# Patient Record
Sex: FEMALE | Race: WHITE | Employment: OTHER | URBAN - METROPOLITAN AREA
[De-identification: names, ages, dates, MRNs, and addresses within clinical notes are randomized per-mention and may not be internally consistent; named-entity substitution may affect disease eponyms.]

---

## 2017-05-15 PROBLEM — I65.23 BILATERAL CAROTID ARTERY STENOSIS: Status: ACTIVE | Noted: 2017-05-15

## 2017-11-20 PROBLEM — I21.4 NSTEMI (NON-ST ELEVATED MYOCARDIAL INFARCTION) (HCC): Status: ACTIVE | Noted: 2017-11-20

## 2017-11-20 PROBLEM — I21.4 NSTEMI (NON-ST ELEVATED MYOCARDIAL INFARCTION) (HCC): Status: RESOLVED | Noted: 2017-11-20 | Resolved: 2017-11-20

## 2017-11-20 PROBLEM — I10 ESSENTIAL HYPERTENSION: Chronic | Status: ACTIVE | Noted: 2017-11-20

## 2017-11-20 PROBLEM — I65.23 BILATERAL CAROTID ARTERY STENOSIS: Status: RESOLVED | Noted: 2017-05-15 | Resolved: 2017-11-20

## 2017-11-20 PROBLEM — E78.5 HYPERLIPIDEMIA LDL GOAL <100: Chronic | Status: ACTIVE | Noted: 2017-11-20

## 2017-11-21 LAB
LEFT VENTRICULAR EJECTION FRACTION MODE: NORMAL
LV EF: 75 %

## 2017-11-30 PROBLEM — R00.1 SINUS BRADYCARDIA: Status: ACTIVE | Noted: 2017-11-30

## 2017-11-30 PROBLEM — Z90.49 HISTORY OF APPENDECTOMY: Status: ACTIVE | Noted: 2017-11-30

## 2017-11-30 PROBLEM — Z87.891 HISTORY OF TOBACCO ABUSE: Status: ACTIVE | Noted: 2017-11-30

## 2017-11-30 PROBLEM — M25.512 CHRONIC LEFT SHOULDER PAIN: Status: ACTIVE | Noted: 2017-11-30

## 2017-11-30 PROBLEM — M47.816 OSTEOARTHRITIS OF LUMBAR SPINE: Status: ACTIVE | Noted: 2017-11-30

## 2017-11-30 PROBLEM — I25.10 CORONARY ARTERY DISEASE INVOLVING NATIVE CORONARY ARTERY OF NATIVE HEART WITHOUT ANGINA PECTORIS: Status: ACTIVE | Noted: 2017-11-30

## 2017-11-30 PROBLEM — Z90.49 H/O PAROTIDECTOMY: Status: ACTIVE | Noted: 2017-11-30

## 2017-11-30 PROBLEM — G89.29 CHRONIC LEFT SHOULDER PAIN: Status: ACTIVE | Noted: 2017-11-30

## 2018-05-01 ENCOUNTER — TELEPHONE (OUTPATIENT)
Dept: CARDIOLOGY CLINIC | Age: 78
End: 2018-05-01

## 2018-05-21 ENCOUNTER — OFFICE VISIT (OUTPATIENT)
Dept: VASCULAR SURGERY | Age: 78
End: 2018-05-21
Payer: MEDICARE

## 2018-05-21 ENCOUNTER — HOSPITAL ENCOUNTER (OUTPATIENT)
Dept: CARDIOLOGY | Age: 78
Discharge: HOME | End: 2018-05-21
Admitting: SURGERY
Payer: MEDICARE

## 2018-05-21 DIAGNOSIS — I65.23 BILATERAL CAROTID ARTERY STENOSIS: Primary | ICD-10-CM

## 2018-05-21 DIAGNOSIS — I65.23 BILATERAL CAROTID ARTERY STENOSIS: ICD-10-CM

## 2018-05-21 PROCEDURE — 93880 EXTRACRANIAL BILAT STUDY: CPT

## 2018-05-21 PROCEDURE — 99213 OFFICE O/P EST LOW 20 MIN: CPT | Performed by: NURSE PRACTITIONER

## 2018-07-24 RX ORDER — METOPROLOL SUCCINATE 25 MG/1
25 TABLET, EXTENDED RELEASE ORAL DAILY
Qty: 90 TABLET | Refills: 3 | Status: SHIPPED | OUTPATIENT
Start: 2018-07-24 | End: 2019-03-14 | Stop reason: SDUPTHER

## 2018-09-17 ENCOUNTER — OFFICE VISIT (OUTPATIENT)
Dept: CARDIOLOGY CLINIC | Age: 78
End: 2018-09-17
Payer: MEDICARE

## 2018-09-17 VITALS
HEART RATE: 57 BPM | BODY MASS INDEX: 30.62 KG/M2 | WEIGHT: 166.4 LBS | HEIGHT: 62 IN | DIASTOLIC BLOOD PRESSURE: 60 MMHG | RESPIRATION RATE: 16 BRPM | SYSTOLIC BLOOD PRESSURE: 138 MMHG

## 2018-09-17 DIAGNOSIS — Z87.891 HISTORY OF TOBACCO ABUSE: ICD-10-CM

## 2018-09-17 DIAGNOSIS — I25.2 H/O NON-ST ELEVATION MYOCARDIAL INFARCTION (NSTEMI): ICD-10-CM

## 2018-09-17 DIAGNOSIS — G89.29 CHRONIC LEFT SHOULDER PAIN: ICD-10-CM

## 2018-09-17 DIAGNOSIS — M47.816 OSTEOARTHRITIS OF LUMBAR SPINE, UNSPECIFIED SPINAL OSTEOARTHRITIS COMPLICATION STATUS: ICD-10-CM

## 2018-09-17 DIAGNOSIS — I10 ESSENTIAL HYPERTENSION: Chronic | ICD-10-CM

## 2018-09-17 DIAGNOSIS — I25.10 CORONARY ARTERY DISEASE INVOLVING NATIVE CORONARY ARTERY OF NATIVE HEART WITHOUT ANGINA PECTORIS: Primary | ICD-10-CM

## 2018-09-17 DIAGNOSIS — E78.5 HYPERLIPIDEMIA LDL GOAL <100: Chronic | ICD-10-CM

## 2018-09-17 DIAGNOSIS — M25.512 CHRONIC LEFT SHOULDER PAIN: ICD-10-CM

## 2018-09-17 DIAGNOSIS — R00.1 SINUS BRADYCARDIA: ICD-10-CM

## 2018-09-17 DIAGNOSIS — G89.29 CHRONIC MIDLINE LOW BACK PAIN, WITH SCIATICA PRESENCE UNSPECIFIED: ICD-10-CM

## 2018-09-17 DIAGNOSIS — K21.9 GASTROESOPHAGEAL REFLUX DISEASE, ESOPHAGITIS PRESENCE NOT SPECIFIED: ICD-10-CM

## 2018-09-17 DIAGNOSIS — M54.5 CHRONIC MIDLINE LOW BACK PAIN, WITH SCIATICA PRESENCE UNSPECIFIED: ICD-10-CM

## 2018-09-17 PROBLEM — M54.50 CHRONIC MIDLINE LOW BACK PAIN: Status: ACTIVE | Noted: 2018-09-17

## 2018-09-17 PROCEDURE — 99213 OFFICE O/P EST LOW 20 MIN: CPT | Performed by: INTERNAL MEDICINE

## 2018-09-17 PROCEDURE — 93000 ELECTROCARDIOGRAM COMPLETE: CPT | Performed by: INTERNAL MEDICINE

## 2018-09-17 RX ORDER — NAPROXEN SODIUM 220 MG
220 TABLET ORAL EVERY OTHER DAY
COMMUNITY
End: 2019-06-28

## 2018-09-17 NOTE — PROGRESS NOTES
OFFICE VISIT        PRIMARY CARE PHYSICIAN:    Doris Bull, III, DO       ALLERGIES / SENSITIVITIES:    Allergies   Allergen Reactions    Other Other (See Comments)     Novocaine--throat swelled up    Sulfa Antibiotics Anaphylaxis        REVIEWED MEDICATIONS:      Current Outpatient Prescriptions:     naproxen sodium (ALEVE) 220 MG tablet, Take 220 mg by mouth every other day, Disp: , Rfl:     metoprolol succinate (TOPROL XL) 25 MG extended release tablet, Take 1 tablet by mouth daily, Disp: 90 tablet, Rfl: 3    amLODIPine (NORVASC) 10 MG tablet, Take 1 tablet by mouth daily, Disp: 90 tablet, Rfl: 3    isosorbide mononitrate (IMDUR) 30 MG extended release tablet, Take 1 tablet by mouth daily, Disp: 90 tablet, Rfl: 3    clopidogrel (PLAVIX) 75 MG tablet, Take 1 tablet by mouth daily, Disp: 90 tablet, Rfl: 3    atorvastatin (LIPITOR) 40 MG tablet, Take 1 tablet by mouth every morning, Disp: 30 tablet, Rfl: 11    valsartan-hydrochlorothiazide (DIOVAN-HCT) 160-12.5 MG per tablet, Take 1 tablet by mouth every morning, Disp: 30 tablet, Rfl: 11    nitroGLYCERIN (NITROSTAT) 0.4 MG SL tablet, Place 1 tablet under the tongue every 5 minutes as needed for Chest pain, Disp: 25 tablet, Rfl: 3    Menthol, Topical Analgesic, (BIOFREEZE ROLL-ON EX), Apply topically as needed , Disp: , Rfl:     aspirin EC 81 MG EC tablet, Take 81 mg by mouth every morning , Disp: , Rfl:       S: REASON FOR VISIT:    Coronary artery disease. Mala Wilson is a pleasant, 70-year-old lady with severe, 3 vessel coronary artery disease. She declined bypass surgery. She has been stable from a cardiac standpoint. She denies chest pain or dyspnea with her daily activities. She denies orthopnea, PND's, lower extremity swelling, palpitations, dizziness, presyncope or syncope. She has been having acid reflux symptoms with a burning/sour sensation in her throat after eating certain foods with belching.   She takes the low dose aspirin every other day: not on days when she takes Aleve for her back pain. REVIEW OF SYSTEMS:    CONSTITUTIONAL:  Denies fevers, chills, night sweats or fatigue. HEENT:  Denies any unusual headaches. Denies recent changes in hearing or vision. Denies dysphagia, hoarseness, hemoptysis, hematemesis or epistaxis. ENDOCRINE:  Denies polyphagia, polydipsia or polyuria. Denies heat or cold intolerance. MUSCULOSKELETAL:  She has chronic low back pain. SKIN:  Denies rashes, ulcers or itching. HEME/LYMPH:  Denies any palpable lymph nodes. She denies bleeding, but reports easy bruisability. HEART:  As above. LUNGS:  Denies cough or sputum production. GI: Denies abdominal pain, nausea, vomiting, diarrhea, constipation, rectal bleeding or tarry stools. She does have acid reflux symptoms. :  Denies hematuria or dysuria. PSYCHIATRIC:  Denies mood changes, anxiety or depression. NEUROLOGIC:  Denies memory loss, motor weakness, numbness, tingling or tremors.                   CARDIOVASCULAR HISTORY:    1. Tobacco abuse:  Patient smoked / PPD x50 years:  Quit 2017. 2.  Hypertension. 3.  Hyperlipidemia. 4.  Severe three-vessel coronary artery disease:  a.  NSTEMI 2017.  b.  Cardiac catheterization 2017:  Severe calcific three-vessel CAD. Systemic HTN. Normal left ventricular size and systolic function. Elevated LVEDP. 5.  Bilateral carotid stenosis:  a.  Carotid ultrasound done on 05/15/2017 reportedly showered 60-79% stenosis on the right and 0-40% stenosis on the left.     PAST MEDICAL HISTORY:  1. As under cardiovascular history. 2.  Osteoarthritis of the lower back. 3.  Chronic left shoulder pain. 4.  Status post appendectomy. 5.  Status post parotidectomy.     FAMILY HISTORY:  Mother  at age 64 from a \"massive heart attack. \"  Father  of prostate cancer. One sister  of liver cirrhosis secondary to alcohol abuse.   One brother  in his 46s from heart disease.     SOCIAL HISTORY:  Smoked 1/4 PPD x50 years, quit 11/20/2017. She denies alcohol or illicit drug use. O:  COMPLETE PHYSICAL EXAM:      /60   Pulse 57   Resp 16   Ht 5' 2\" (1.575 m)   Wt 166 lb 6.4 oz (75.5 kg)   LMP  (Approximate)   BMI 30.43 kg/m²      General:          Elderly lady in no acute distress. HEENT:           Normocephalic and atraumatic head. Bilateral carotid bruits heard. Carotid upstrokes normal bilaterally. No thyromegaly. Sclerae not icteric. No xanthelasmas. Mucous membranes moist.  Chest:              Symmetrical and nontender. No deformities. Lungs:             Clear to auscultation bilaterally. Heart:              Normal S1 and S2. No S3 or S4. No murmurs or rubs. Abdomen:        Soft, nontender without organomegaly or masses. No bruits. Normal bowel sounds. Extremities:     No edema. Pulses normal.  Skin:                Normal turgor. No rashes or ulcers noted. Neurologic:      Oriented x3. No motor or sensory deficits detected. REVIEW OF DIAGNOSTIC TESTS:    1. EKG done today showed sinus bradycardia with occasional PAC's with deep T wave inversions in the inferior and anterolateral leads and with old septal MI: Unchanged. ASSESSMENT / DIAGNOSIS:   1. Severe calcific three-vessel coronary artery disease: Clinically stable. 2.  Hypertension:  Adequately controlled. 3.  Hyperlipidemia:  On statin therapy. 4.  Sinus bradycardia on low dose beta blocker therapy. 5.  Osteoarthritis of the lower back with chronic low back pain. 6.  Chronic left shoulder pain. 7.  History of tobacco abuse. 8.  Acid reflux symptoms. TREATMENT PLAN:  1. Reassure. 2.  Continue current medications. 3.  Patient advised to take symptomatic treatment for acid reflux symptoms: Consider H2 blockers or proton pump inhibitors: Will defer to primary care physician. 4.  Follow up with Cardiology in 6 months or on a prn basis. Rachel [address] [facility] [address]  [phone]

## 2018-09-17 NOTE — PATIENT INSTRUCTIONS
especially good for you. Examples include spinach, carrots, peaches, and berries.     · Foods high in fiber can reduce your cholesterol and provide important vitamins and minerals. High-fiber foods include whole-grain cereals and breads, oatmeal, beans, brown rice, citrus fruits, and apples.     · Limit drinks and foods with added sugar. These include candy, desserts, and soda pop.    Lifestyle changes    · If your doctor recommends it, get more exercise. Walking is a good choice. Bit by bit, increase the amount you walk every day. Try for at least 30 minutes on most days of the week. You also may want to swim, bike, or do other activities.     · Do not smoke. If you need help quitting, talk to your doctor about stop-smoking programs and medicines. These can increase your chances of quitting for good. Quitting smoking may be the most important step you can take to protect your heart. It is never too late to quit. You will get health benefits right away.     · Limit alcohol to 2 drinks a day for men and 1 drink a day for women. Too much alcohol can cause health problems. Medicines    · Take your medicines exactly as prescribed. Call your doctor if you think you are having a problem with your medicine.     · If your doctor recommends aspirin, take the amount directed each day. Make sure you take aspirin and not another kind of pain reliever, such as acetaminophen (Tylenol). If you take ibuprofen (such as Advil or Motrin) for other problems, take aspirin at least 2 hours before taking ibuprofen. When should you call for help? Call 911 if you have symptoms of a heart attack.  These may include:    · Chest pain or pressure, or a strange feeling in the chest.     · Sweating.     · Shortness of breath.     · Pain, pressure, or a strange feeling in the back, neck, jaw, or upper belly or in one or both shoulders or arms.     · Lightheadedness or sudden weakness.     · A fast or irregular heartbeat.    After you call 911,

## 2019-01-03 ENCOUNTER — TELEPHONE (OUTPATIENT)
Dept: VASCULAR SURGERY | Age: 79
End: 2019-01-03

## 2019-02-08 RX ORDER — ATORVASTATIN CALCIUM 40 MG/1
40 TABLET, FILM COATED ORAL EVERY MORNING
Qty: 30 TABLET | Refills: 11 | Status: SHIPPED | OUTPATIENT
Start: 2019-02-08 | End: 2019-03-14 | Stop reason: SDUPTHER

## 2019-02-08 RX ORDER — ISOSORBIDE MONONITRATE 30 MG/1
30 TABLET, EXTENDED RELEASE ORAL DAILY
Qty: 90 TABLET | Refills: 3 | Status: SHIPPED | OUTPATIENT
Start: 2019-02-08 | End: 2019-03-14 | Stop reason: SDUPTHER

## 2019-02-08 RX ORDER — CLOPIDOGREL BISULFATE 75 MG/1
75 TABLET ORAL DAILY
Qty: 90 TABLET | Refills: 3 | Status: SHIPPED | OUTPATIENT
Start: 2019-02-08 | End: 2019-03-15 | Stop reason: SDUPTHER

## 2019-02-18 RX ORDER — AMLODIPINE BESYLATE 10 MG/1
10 TABLET ORAL DAILY
Qty: 90 TABLET | Refills: 3 | Status: SHIPPED | OUTPATIENT
Start: 2019-02-18 | End: 2019-03-15 | Stop reason: SDUPTHER

## 2019-03-13 ENCOUNTER — HOSPITAL ENCOUNTER (OUTPATIENT)
Age: 79
Discharge: HOME | End: 2019-03-15
Payer: MEDICARE

## 2019-03-13 PROCEDURE — 88305 TISSUE EXAM BY PATHOLOGIST: CPT

## 2019-03-15 RX ORDER — CLOPIDOGREL BISULFATE 75 MG/1
75 TABLET ORAL DAILY
Qty: 90 TABLET | Refills: 3 | Status: SHIPPED | OUTPATIENT
Start: 2019-03-15 | End: 2019-06-28

## 2019-03-15 RX ORDER — METOPROLOL SUCCINATE 25 MG/1
25 TABLET, EXTENDED RELEASE ORAL DAILY
Qty: 90 TABLET | Refills: 3 | Status: SHIPPED | OUTPATIENT
Start: 2019-03-15 | End: 2019-06-28 | Stop reason: DRUGHIGH

## 2019-03-15 RX ORDER — ISOSORBIDE MONONITRATE 30 MG/1
30 TABLET, EXTENDED RELEASE ORAL DAILY
Qty: 90 TABLET | Refills: 3 | Status: SHIPPED | OUTPATIENT
Start: 2019-03-15 | End: 2019-06-28 | Stop reason: DRUGHIGH

## 2019-03-15 RX ORDER — ATORVASTATIN CALCIUM 40 MG/1
40 TABLET, FILM COATED ORAL EVERY MORNING
Qty: 90 TABLET | Refills: 3 | Status: SHIPPED
Start: 2019-03-15 | End: 2020-04-13 | Stop reason: SDUPTHER

## 2019-03-15 RX ORDER — AMLODIPINE BESYLATE 10 MG/1
10 TABLET ORAL DAILY
Qty: 90 TABLET | Refills: 3 | Status: SHIPPED | OUTPATIENT
Start: 2019-03-15 | End: 2019-10-18

## 2019-03-15 RX ORDER — VALSARTAN AND HYDROCHLOROTHIAZIDE 160; 12.5 MG/1; MG/1
1 TABLET, FILM COATED ORAL EVERY MORNING
Qty: 90 TABLET | Refills: 3 | Status: SHIPPED
Start: 2019-03-15 | End: 2020-06-02 | Stop reason: SDUPTHER

## 2019-04-09 ENCOUNTER — APPOINTMENT (OUTPATIENT)
Dept: GENERAL RADIOLOGY | Age: 79
End: 2019-04-09
Payer: MEDICARE

## 2019-04-09 ENCOUNTER — HOSPITAL ENCOUNTER (EMERGENCY)
Age: 79
Discharge: HOME | End: 2019-04-09
Attending: EMERGENCY MEDICINE
Payer: MEDICARE

## 2019-04-09 VITALS
OXYGEN SATURATION: 94 % | HEART RATE: 73 BPM | TEMPERATURE: 97.9 F | SYSTOLIC BLOOD PRESSURE: 163 MMHG | RESPIRATION RATE: 18 BRPM | DIASTOLIC BLOOD PRESSURE: 62 MMHG
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
## 2019-04-09 DIAGNOSIS — S80.01XA CONTUSION OF RIGHT KNEE, INITIAL ENCOUNTER: ICD-10-CM

## 2019-04-09 PROCEDURE — 6370000000 HC RX 637 (ALT 250 FOR IP): Performed by: EMERGENCY MEDICINE

## 2019-04-09 PROCEDURE — 73564 X-RAY EXAM KNEE 4 OR MORE: CPT

## 2019-04-09 PROCEDURE — 99284 EMERGENCY DEPT VISIT MOD MDM: CPT

## 2019-04-09 RX ORDER — HYDROCODONE BITARTRATE AND ACETAMINOPHEN 5; 325 MG/1; MG/1
1 TABLET ORAL ONCE
Status: DISCONTINUED | OUTPATIENT
Start: 2019-04-09 | End: 2019-04-09

## 2019-04-09 RX ORDER — ACETAMINOPHEN 500 MG
1000 TABLET ORAL ONCE
Status: COMPLETED | OUTPATIENT
Start: 2019-04-09 | End: 2019-04-09

## 2019-04-09 RX ADMIN — ACETAMINOPHEN 1000 MG: 500 TABLET ORAL at 20:10

## 2019-04-09 ASSESSMENT — PAIN SCALES - GENERAL
PAINLEVEL_OUTOF10: 0
PAINLEVEL_OUTOF10: 2

## 2019-04-09 NOTE — ED NOTES
Ice applied to right knee, trace bruising noted. Patient states she is able to bend knee but having trouble bearing weight on leg due to pain. Denies pain in hip. Denies numbness or tingling in leg. Pedal pulse 3+.       Lukas Malloy RN  04/09/19 1950

## 2019-04-09 NOTE — ED PROVIDER NOTES
Department of Emergency Medicine   ED  Provider Note  Admit Date/RoomTime: 4/9/2019  6:54 PM  ED Room: 24/24 4/9/19  7:29 PM    History of Present Illness:   Carin Kearney is a 78 y.o. female presenting to the ED for a fall on her right knee, beginning a few minutes prior to arrival.  The complaint has been persistent, moderate in severity, and worsened by attempting to ambulate. She states she has been unable to ambulate since the fall due to pain. She did not take anything for her symptoms prior to arrival. Daughter is concerned that the patient shoulid be discharged with a walker as her gait is somewhat unsteady. Review of Systems:   Pertinent positives and negatives are stated within HPI, all other systems reviewed and are negative.          --------------------------------------------- PAST HISTORY ---------------------------------------------  Past Medical History:  has a past medical history of Coronary artery disease involving native coronary artery of native heart without angina pectoris, Hyperlipidemia, Hypertension, NSTEMI (non-ST elevated myocardial infarction) ([facility] [address] Osteoarthritis, and Shoulder pain. Past Surgical History:  has a past surgical history that includes Appendectomy; Parotidectomy; and Cardiac catheterization (11/20/2017). Social History:  reports that she quit smoking about 16 months ago. Her smoking use included cigarettes. She has a 42.75 pack-year smoking history. She has never used smokeless tobacco. She reports that she does not drink alcohol or use drugs. Family History: family history includes Alcohol Abuse in her brother, father, and sister; Early Death in her mother and sister; Heart Disease in her brother and mother; Prostate Cancer in her father. The patients home medications have been reviewed. Allergies:  Other and Sulfa antibiotics    -------------------------------------------------- RESULTS -------------------------------------------------  All laboratory and radiology results have been personally reviewed by myself   LABS:  No results found for this visit on 04/09/19. RADIOLOGY:  Interpreted by Radiologist.  XR KNEE RIGHT (MIN 4 VIEWS)   Final Result   No acute osseous abnormality appreciable. Chronic and degenerative findings as noted. Continued follow-up advised should symptoms persist in any event.          ------------------------- NURSING NOTES AND VITALS REVIEWED ---------------------------   The nursing notes within the ED encounter and vital signs as below have been reviewed. BP (!) 163/62   Pulse 73   Temp 97.9 °F (36.6 °C) (Oral)   Resp 18   LMP  (Approximate)   SpO2 94%   Oxygen Saturation Interpretation: Normal      ---------------------------------------------------PHYSICAL EXAM--------------------------------------    Constitutional/General: Alert and oriented x3, well appearing, non toxic in NAD  Head: Normocephalic and atraumatic  Eyes: PERRL, EOMI, conjunctiva normal, sclera non icteric  Mouth: Oropharynx clear, handling secretions, no trismus, no asymmetry of the posterior oropharynx or uvular edema  Neck: Supple, full ROM, non tender to palpation in the midline, no stridor, no crepitus, no meningeal signs  Respiratory: Lungs clear to auscultation bilaterally, no wheezes, rales, or rhonchi. Not in respiratory distress  Cardiovascular:  Regular rate. Regular rhythm. No murmurs, gallops, or rubs. 2+ distal pulses  Chest: No chest wall tenderness  GI:  Abdomen Soft, Non tender, Non distended. +BS. No organomegaly, no palpable masses,  No rebound, guarding, or rigidity. Musculoskeletal: Moves all extremities x 4. Warm and well perfused. The right knee is modestly swollen and the distal medial femur is tender to palpation  Integument: skin warm and dry. No rashes.    Lymphatic: no lymphadenopathy noted  Neurologic: GCS 15, no focal deficits, symmetric strength 5/5 in the upper and lower extremities bilaterally  Psychiatric: Normal Affect      ------------------------------ ED COURSE/MEDICAL DECISION MAKING----------------------  Medications   acetaminophen (TYLENOL) tablet 1,000 mg (has no administration in time range)         Medical Decision Making:    Fall, knee pain and swelling. No pain of hip or foot. Xrays obtained and negative for fracture. She was able to bear weight to the bathroom even prior to ace wrap and tylenol. They have a walker at home and the daughter is available to help her into the house. Okay for discharge home. Counseling: The emergency provider has spoken with the patient and discussed todays results, in addition to providing specific details for the plan of care and counseling regarding the diagnosis and prognosis. Questions are answered at this time and they are agreeable with the plan.      --------------------------------- IMPRESSION AND DISPOSITION ---------------------------------    IMPRESSION  1. Fall, initial encounter    2.  Contusion of right knee, initial encounter        DISPOSITION  Disposition: Discharge to home  Patient condition is good               DO Phill Wellington  04/09/19 2007

## 2019-05-28 RX ORDER — NITROGLYCERIN 0.4 MG/1
0.4 TABLET SUBLINGUAL EVERY 5 MIN PRN
Qty: 25 TABLET | Refills: 3 | Status: SHIPPED
Start: 2019-05-28 | End: 2020-06-02 | Stop reason: SDUPTHER

## 2019-06-20 ENCOUNTER — TELEPHONE (OUTPATIENT)
Dept: CARDIOLOGY CLINIC | Age: 79
End: 2019-06-20

## 2019-06-20 NOTE — TELEPHONE ENCOUNTER
Daughter Fatmata Can called about her mom. Says she is taking more NTG than usual.  Wanted to see if mother's appt could be moved up. I told her he is only in [place] M-W-F and M-W is full. 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 Wants to speak with you Walt Ames.  (call first [phone] and they can get her  Cell [phone]).

## 2019-06-20 NOTE — TELEPHONE ENCOUNTER
6/20/19 @ 3:00  SPOKE [address] [place]. PAU HAS BEEN TAKING HER NITROGLYCERIN EVERY OTHER DAY FOR PAIN IN HER UPPER CHEST AND THROAT AREA. SHE STATES THE PAIN IN LIKE HEARTBURN BUT HER HEARTBURN MEDS DO NOT HELP. SHE HAS AN APPT. TO SEE DR LEE ON 6/28/19. I SPOKE TO DR LEE ABOUT THIS AND HE SAID THAT IF SHE CONTINUES TO NEED THE NITRO THIS OFTEN SHE NEEDS TO GO TO THE ER. I GAVE THIS INFORMATION TO RADHA.   FRANSISCA

## 2019-06-28 ENCOUNTER — OFFICE VISIT (OUTPATIENT)
Dept: CARDIOLOGY CLINIC | Age: 79
End: 2019-06-28
Payer: MEDICARE

## 2019-06-28 VITALS
BODY MASS INDEX: 30.07 KG/M2 | WEIGHT: 163.4 LBS | HEART RATE: 84 BPM | SYSTOLIC BLOOD PRESSURE: 122 MMHG | HEIGHT: 62 IN | RESPIRATION RATE: 18 BRPM | DIASTOLIC BLOOD PRESSURE: 66 MMHG

## 2019-06-28 DIAGNOSIS — I20.0 WORSENING ANGINA (HCC): ICD-10-CM

## 2019-06-28 DIAGNOSIS — K21.9 GASTROESOPHAGEAL REFLUX DISEASE, ESOPHAGITIS PRESENCE NOT SPECIFIED: ICD-10-CM

## 2019-06-28 DIAGNOSIS — I10 ESSENTIAL HYPERTENSION: ICD-10-CM

## 2019-06-28 DIAGNOSIS — M47.9 OSTEOARTHRITIS OF LOWER BACK: ICD-10-CM

## 2019-06-28 DIAGNOSIS — I48.91 NEW ONSET ATRIAL FIBRILLATION (HCC): ICD-10-CM

## 2019-06-28 DIAGNOSIS — M25.512 CHRONIC LEFT SHOULDER PAIN: ICD-10-CM

## 2019-06-28 DIAGNOSIS — I25.10 CAD IN NATIVE ARTERY: Primary | ICD-10-CM

## 2019-06-28 DIAGNOSIS — G89.29 CHRONIC LEFT SHOULDER PAIN: ICD-10-CM

## 2019-06-28 DIAGNOSIS — E78.5 DYSLIPIDEMIA: ICD-10-CM

## 2019-06-28 PROCEDURE — 99214 OFFICE O/P EST MOD 30 MIN: CPT | Performed by: INTERNAL MEDICINE

## 2019-06-28 PROCEDURE — 93000 ELECTROCARDIOGRAM COMPLETE: CPT | Performed by: INTERNAL MEDICINE

## 2019-06-28 RX ORDER — METOPROLOL SUCCINATE 50 MG/1
50 TABLET, EXTENDED RELEASE ORAL DAILY
Qty: 90 TABLET | Refills: 3 | Status: SHIPPED | OUTPATIENT
Start: 2019-06-28 | End: 2019-10-18 | Stop reason: DRUGHIGH

## 2019-06-28 RX ORDER — METOPROLOL SUCCINATE 50 MG/1
25 TABLET, EXTENDED RELEASE ORAL DAILY
Qty: 30 TABLET | Refills: 11 | Status: SHIPPED | OUTPATIENT
Start: 2019-06-28 | End: 2019-06-28 | Stop reason: SDUPTHER

## 2019-06-28 RX ORDER — ISOSORBIDE MONONITRATE 60 MG/1
60 TABLET, EXTENDED RELEASE ORAL DAILY
Qty: 30 TABLET | Refills: 11 | Status: SHIPPED | OUTPATIENT
Start: 2019-06-28 | End: 2019-06-28 | Stop reason: SDUPTHER

## 2019-06-28 RX ORDER — ACETAMINOPHEN 325 MG/1
325 TABLET ORAL EVERY 6 HOURS PRN
COMMUNITY

## 2019-06-28 RX ORDER — ISOSORBIDE MONONITRATE 60 MG/1
60 TABLET, EXTENDED RELEASE ORAL DAILY
Qty: 90 TABLET | Refills: 3 | Status: SHIPPED | OUTPATIENT
Start: 2019-06-28 | End: 2020-06-02 | Stop reason: SDUPTHER

## 2019-07-16 ENCOUNTER — TELEPHONE (OUTPATIENT)
Dept: CARDIOLOGY CLINIC | Age: 79
End: 2019-07-16

## 2019-07-16 RX ORDER — AMLODIPINE BESYLATE 5 MG/1
5 TABLET ORAL DAILY
Qty: 30 TABLET | Refills: 5 | Status: SHIPPED | OUTPATIENT
Start: 2019-07-16 | End: 2019-07-31 | Stop reason: SDUPTHER

## 2019-07-31 RX ORDER — AMLODIPINE BESYLATE 5 MG/1
5 TABLET ORAL DAILY
Qty: 90 TABLET | Refills: 3 | Status: SHIPPED
Start: 2019-07-31 | End: 2020-09-15 | Stop reason: SDUPTHER

## 2019-10-18 ENCOUNTER — OFFICE VISIT (OUTPATIENT)
Dept: CARDIOLOGY CLINIC | Age: 79
End: 2019-10-18
Payer: MEDICARE

## 2019-10-18 VITALS
HEART RATE: 87 BPM | RESPIRATION RATE: 18 BRPM | HEIGHT: 62 IN | WEIGHT: 165 LBS | DIASTOLIC BLOOD PRESSURE: 68 MMHG | SYSTOLIC BLOOD PRESSURE: 124 MMHG | BODY MASS INDEX: 30.36 KG/M2

## 2019-10-18 DIAGNOSIS — I10 ESSENTIAL HYPERTENSION: ICD-10-CM

## 2019-10-18 DIAGNOSIS — M54.50 CHRONIC MIDLINE LOW BACK PAIN WITHOUT SCIATICA: ICD-10-CM

## 2019-10-18 DIAGNOSIS — Z87.891 EX-SMOKER: ICD-10-CM

## 2019-10-18 DIAGNOSIS — E78.5 DYSLIPIDEMIA: ICD-10-CM

## 2019-10-18 DIAGNOSIS — I48.19 PERSISTENT ATRIAL FIBRILLATION (HCC): ICD-10-CM

## 2019-10-18 DIAGNOSIS — G89.29 CHRONIC MIDLINE LOW BACK PAIN WITHOUT SCIATICA: ICD-10-CM

## 2019-10-18 DIAGNOSIS — G89.29 CHRONIC LEFT SHOULDER PAIN: ICD-10-CM

## 2019-10-18 DIAGNOSIS — Z79.01 ANTICOAGULATED BY ANTICOAGULATION TREATMENT: ICD-10-CM

## 2019-10-18 DIAGNOSIS — M25.512 CHRONIC LEFT SHOULDER PAIN: ICD-10-CM

## 2019-10-18 DIAGNOSIS — M47.816 OSTEOARTHRITIS OF LUMBAR SPINE, UNSPECIFIED SPINAL OSTEOARTHRITIS COMPLICATION STATUS: ICD-10-CM

## 2019-10-18 DIAGNOSIS — Z86.79 H/O SINUS BRADYCARDIA: ICD-10-CM

## 2019-10-18 DIAGNOSIS — I25.118 CORONARY ARTERY DISEASE OF NATIVE ARTERY OF NATIVE HEART WITH STABLE ANGINA PECTORIS (HCC): Primary | ICD-10-CM

## 2019-10-18 PROCEDURE — 99214 OFFICE O/P EST MOD 30 MIN: CPT | Performed by: INTERNAL MEDICINE

## 2019-10-18 PROCEDURE — 93000 ELECTROCARDIOGRAM COMPLETE: CPT | Performed by: INTERNAL MEDICINE

## 2019-10-18 RX ORDER — METOPROLOL SUCCINATE 100 MG/1
100 TABLET, EXTENDED RELEASE ORAL DAILY
Qty: 90 TABLET | Refills: 5 | Status: SHIPPED | OUTPATIENT
Start: 2019-10-18 | End: 2020-12-08 | Stop reason: SDUPTHER

## 2019-10-18 RX ORDER — RANOLAZINE 500 MG/1
500 TABLET, EXTENDED RELEASE ORAL 2 TIMES DAILY
Qty: 60 TABLET | Refills: 5 | Status: SHIPPED | OUTPATIENT
Start: 2019-10-18 | End: 2019-10-18

## 2019-10-18 RX ORDER — RANOLAZINE 500 MG/1
500 TABLET, EXTENDED RELEASE ORAL 2 TIMES DAILY
Qty: 180 TABLET | Refills: 5 | Status: SHIPPED | OUTPATIENT
Start: 2019-10-18 | End: 2021-01-05 | Stop reason: SDUPTHER

## 2019-10-18 RX ORDER — METOPROLOL SUCCINATE 100 MG/1
100 TABLET, EXTENDED RELEASE ORAL DAILY
Qty: 30 TABLET | Refills: 5 | Status: SHIPPED | OUTPATIENT
Start: 2019-10-18 | End: 2019-10-18

## 2020-03-02 ENCOUNTER — OFFICE VISIT (OUTPATIENT)
Dept: CARDIOLOGY CLINIC | Age: 80
End: 2020-03-02
Payer: MEDICARE

## 2020-03-02 VITALS
SYSTOLIC BLOOD PRESSURE: 124 MMHG | DIASTOLIC BLOOD PRESSURE: 62 MMHG | HEART RATE: 62 BPM | RESPIRATION RATE: 16 BRPM | WEIGHT: 164 LBS | BODY MASS INDEX: 30.18 KG/M2 | OXYGEN SATURATION: 93 % | HEIGHT: 62 IN

## 2020-03-02 PROCEDURE — 99213 OFFICE O/P EST LOW 20 MIN: CPT | Performed by: INTERNAL MEDICINE

## 2020-03-02 PROCEDURE — 93000 ELECTROCARDIOGRAM COMPLETE: CPT | Performed by: INTERNAL MEDICINE

## 2020-03-02 SDOH — HEALTH STABILITY: MENTAL HEALTH: HOW OFTEN DO YOU HAVE A DRINK CONTAINING ALCOHOL?: NEVER

## 2020-03-04 NOTE — PROGRESS NOTES
OFFICE VISIT        PRIMARY CARE PHYSICIAN:    Bennett Escalante III, DO       ALLERGIES / SENSITIVITIES:    Allergies   Allergen Reactions    Other Other (See Comments)     Novocaine--throat swelled up    Sulfa Antibiotics Anaphylaxis        REVIEWED MEDICATIONS:      Current Outpatient Medications:     metoprolol succinate (TOPROL XL) 100 MG extended release tablet, Take 1 tablet by mouth daily, Disp: 90 tablet, Rfl: 5    ranolazine (RANEXA) 500 MG extended release tablet, Take 1 tablet by mouth 2 times daily, Disp: 180 tablet, Rfl: 5    amLODIPine (NORVASC) 5 MG tablet, Take 1 tablet by mouth daily, Disp: 90 tablet, Rfl: 3    acetaminophen (TYLENOL) 500 MG tablet, Take 500 mg by mouth every 6 hours as needed for Pain, Disp: , Rfl:     Famotidine (PEPCID AC PO), Take 20 mg by mouth daily as needed, Disp: , Rfl:     isosorbide mononitrate (IMDUR) 60 MG extended release tablet, Take 1 tablet by mouth daily, Disp: 90 tablet, Rfl: 3    rivaroxaban (XARELTO) 20 MG TABS tablet, Take 1 tablet by mouth daily (with breakfast), Disp: 90 tablet, Rfl: 3    nitroGLYCERIN (NITROSTAT) 0.4 MG SL tablet, Place 1 tablet under the tongue every 5 minutes as needed for Chest pain, Disp: 25 tablet, Rfl: 3    atorvastatin (LIPITOR) 40 MG tablet, Take 1 tablet by mouth every morning, Disp: 90 tablet, Rfl: 3    valsartan-hydrochlorothiazide (DIOVAN-HCT) 160-12.5 MG per tablet, Take 1 tablet by mouth every morning, Disp: 90 tablet, Rfl: 3    Menthol, Topical Analgesic, (BIOFREEZE ROLL-ON EX), Apply topically as needed , Disp: , Rfl:     aspirin EC 81 MG EC tablet, Take 81 mg by mouth every morning , Disp: , Rfl:       S: REASON FOR VISIT:    Coronary artery disease and atrial fibrillation. Kodak Bryant is a pleasant, 58-year-old lady with cardiovascular history as described below. She was seen in the office in 2019 at which time she was started on Ranexa.   She reports that since starting Ranexa, she has not had any chest/throat

## 2020-04-13 RX ORDER — ATORVASTATIN CALCIUM 40 MG/1
40 TABLET, FILM COATED ORAL EVERY MORNING
Qty: 90 TABLET | Refills: 3 | Status: SHIPPED
Start: 2020-04-13 | End: 2021-05-03 | Stop reason: SDUPTHER

## 2020-06-02 RX ORDER — ISOSORBIDE MONONITRATE 60 MG/1
60 TABLET, EXTENDED RELEASE ORAL DAILY
Qty: 90 TABLET | Refills: 3 | OUTPATIENT
Start: 2020-06-02 | End: 2021-06-03

## 2020-06-02 RX ORDER — VALSARTAN AND HYDROCHLOROTHIAZIDE 160; 12.5 MG/1; MG/1
1 TABLET, FILM COATED ORAL EVERY MORNING
Qty: 90 TABLET | Refills: 3 | OUTPATIENT
Start: 2020-06-02 | End: 2021-07-06 | Stop reason: SDUPTHER

## 2020-06-02 RX ORDER — NITROGLYCERIN 0.4 MG/1
0.4 TABLET SUBLINGUAL EVERY 5 MIN PRN
Qty: 25 TABLET | Refills: 3 | OUTPATIENT
Start: 2020-06-02

## 2020-07-22 ENCOUNTER — HOSPITAL ENCOUNTER (OUTPATIENT)
Age: 80
Discharge: HOME | End: 2020-07-24
Payer: MEDICARE

## 2020-07-22 PROCEDURE — 88305 TISSUE EXAM BY PATHOLOGIST: CPT

## 2020-09-14 ENCOUNTER — OFFICE VISIT (OUTPATIENT)
Dept: CARDIOLOGY CLINIC | Age: 80
End: 2020-09-14
Payer: MEDICARE

## 2020-09-14 VITALS
HEIGHT: 62 IN | HEART RATE: 62 BPM | RESPIRATION RATE: 16 BRPM | WEIGHT: 160.4 LBS | DIASTOLIC BLOOD PRESSURE: 60 MMHG | BODY MASS INDEX: 29.52 KG/M2 | SYSTOLIC BLOOD PRESSURE: 118 MMHG

## 2020-09-14 PROCEDURE — 99213 OFFICE O/P EST LOW 20 MIN: CPT | Performed by: INTERNAL MEDICINE

## 2020-09-14 PROCEDURE — 93000 ELECTROCARDIOGRAM COMPLETE: CPT | Performed by: INTERNAL MEDICINE

## 2020-09-14 RX ORDER — TRAMADOL HYDROCHLORIDE 50 MG/1
50 TABLET ORAL DAILY PRN
COMMUNITY

## 2020-09-14 NOTE — PROGRESS NOTES
OFFICE VISIT        PRIMARY CARE PHYSICIAN:    Elizabeth Sesay, III, DO       ALLERGIES / SENSITIVITIES:    Allergies   Allergen Reactions    Other Other (See Comments)     Novocaine--throat swelled up    Sulfa Antibiotics Anaphylaxis        REVIEWED MEDICATIONS:      Current Outpatient Medications:     traMADol (ULTRAM) 50 MG tablet, Take 50 mg by mouth daily. , Disp: , Rfl:     isosorbide mononitrate (IMDUR) 60 MG extended release tablet, Take 1 tablet by mouth daily, Disp: 90 tablet, Rfl: 3    nitroGLYCERIN (NITROSTAT) 0.4 MG SL tablet, Place 1 tablet under the tongue every 5 minutes as needed for Chest pain, Disp: 25 tablet, Rfl: 3    valsartan-hydrochlorothiazide (DIOVAN-HCT) 160-12.5 MG per tablet, Take 1 tablet by mouth every morning, Disp: 90 tablet, Rfl: 3    atorvastatin (LIPITOR) 40 MG tablet, Take 1 tablet by mouth every morning, Disp: 90 tablet, Rfl: 3    rivaroxaban (XARELTO) 20 MG TABS tablet, Take 1 tablet by mouth daily (with breakfast), Disp: 90 tablet, Rfl: 3    metoprolol succinate (TOPROL XL) 100 MG extended release tablet, Take 1 tablet by mouth daily, Disp: 90 tablet, Rfl: 5    ranolazine (RANEXA) 500 MG extended release tablet, Take 1 tablet by mouth 2 times daily, Disp: 180 tablet, Rfl: 5    amLODIPine (NORVASC) 5 MG tablet, Take 1 tablet by mouth daily, Disp: 90 tablet, Rfl: 3    acetaminophen (TYLENOL) 500 MG tablet, Take 500 mg by mouth every 6 hours as needed for Pain, Disp: , Rfl:     Famotidine (PEPCID AC PO), Take 20 mg by mouth daily as needed, Disp: , Rfl:     Menthol, Topical Analgesic, (BIOFREEZE ROLL-ON EX), Apply topically as needed , Disp: , Rfl:     aspirin EC 81 MG EC tablet, Take 81 mg by mouth every morning , Disp: , Rfl:       S: REASON FOR VISIT:    Coronary artery disease and chronic atrial fibrillation. Rohith Ashley is a pleasant, 69-year-old lady with severe 3 vessel coronary artery disease.   She declined surgical revascularization at the time she presented with the non ST elevation myocardial infarction in 11/2017 at which time, cardiac catheterization showed severe calcific 3 vessel coronary artery disease. She continues to decline surgical revascularization. She also, and as mentioned above, she has chronic atrial fibrillation with controlled ventricular response. She remains stable from a cardiac standpoint since she has been placed on Ranexa. She has not used any sublingual Nitroglycerin again since she has been placed on Ranexa. She goes about daily doing her housework chores and takes care of her . She also goes to the [employer] 3 times a week. She goes to bed at 12:30 in the morning and reports that, at times, she wakes up tired in the morning. She denies any significant dyspnea with her daily activities. She denies orthopnea, PND's or lower extremity swelling. She denies the subjective feeling of palpitations. She denies dizziness, presyncope or syncope. REVIEW OF SYSTEMS:    CONSTITUTIONAL:  Denies fevers, chills, night sweats or fatigue. HEENT:  Denies any unusual headaches.  Denies recent changes in hearing or vision.  Denies dysphagia, hoarseness, hemoptysis, hematemesis or epistaxis. ENDOCRINE:  Denies polyphagia, polydipsia or polyuria.  Denies heat or cold intolerance. MUSCULOSKELETAL:  She has chronic low back pain. SKIN:  Denies rashes, ulcers or itching. HEME/LYMPH:  Denies any palpable lymph nodes. She denies bleeding, but reports easy bruisability.    HEART:  As above. LUNGS:  Denies cough or sputum production. GI: Denies abdominal pain, nausea, vomiting, diarrhea, constipation, rectal bleeding or tarry stools. She does have acid reflux symptoms. :  Denies hematuria or dysuria. PSYCHIATRIC:  Denies mood changes, anxiety or depression.   NEUROLOGIC:  Denies memory loss, motor weakness, numbness, tingling or tremors.        CARDIOVASCULAR HISTORY:    1.  Tobacco abuse:  Patient smoked 1/4 PPD x50 years:  Quit 2017.  2.  Hypertension. 3.  Hyperlipidemia. 4.  Severe three-vessel coronary artery disease:  a.  NSTEMI 2017.  b. Shalonda Fortune catheterization 2017:  Severe calcific three-vessel CAD.  Systemic HTN.  Normal left ventricular size and systolic function.  Elevated LVEDP.    5.  Bilateral carotid stenosis:  a.  Carotid ultrasound done on 05/15/2017 reportedly showered 60-79% stenosis on the right and 0-40% stenosis on the left. 6.  Atrial fibrillation noted during the office visit on 2019, date of onset unclear.       PAST MEDICAL HISTORY:  1.  As under cardiovascular history. 2.  Osteoarthritis of the lower back. 3.  History of chronic left shoulder pain. 4.  Status post appendectomy. 5.  Status post parotidectomy.     FAMILY HISTORY:  Mother  at age 64 from a \"massive heart attack. \" Starling Ripple  of prostate cancer.  One sister  of liver cirrhosis secondary to alcohol abuse.  One brother  in his 47's from heart disease.     SOCIAL HISTORY:  Smoked 1/4 PPD x50 years, quit 2017. Obi Camarillo denies alcohol or illicit drug use.        O:  COMPLETE PHYSICAL EXAM:      /60 (Site: Left Upper Arm, Position: Sitting, Cuff Size: Medium Adult)   Pulse 62   Resp 16   Ht 5' 2\" (1.575 m)   Wt 160 lb 6.4 oz (72.8 kg)   BMI 29.34 kg/m²      General:          Well-developed, well-nourished, elderly lady in no acute distress. HEENT:           Normocephalic and atraumatic head. Bilateral carotid bruits heard. Carotid upstrokes normal bilaterally.  No thyromegaly.  Sclerae not icteric.  No xanthelasmas.  Mucous membranes moist.  Chest:              Symmetrical and nontender.  No deformities. Lungs:             Clear to auscultation bilaterally. Heart:              Irregularly irregular.  Normal S1 and S2.  No S3.  No murmurs or rubs. Abdomen:        Soft, nontender without organomegaly or masses.  No bruits.  Normal bowel sounds.   Extremities:     No edema.  Pulses normal.  Skin:                Normal turgor. No rashes or ulcers noted. Neurologic:      Oriented x3.  No motor or sensory deficits detected. REVIEW OF DIAGNOSTIC TESTS:    1. EKG today showed atrial fibrillation with a ventricular response rate of 62 bpm with septal infarct pattern and with inferior and anterolateral ST-T wave changes consistent with repolarization abnormality and/or ischemia. ASSESSMENT / DIAGNOSIS:   1.  Severe calcific three-vessel coronary artery disease. Stable with no angina since adding Ranexa.     2.  Permanent atrial fibrillation with controlled ventricular response, anticoagulated on Xarelto.    3.  Hypertension:  Adequately controlled. 4.  Hyperlipidemia:  On statin therapy. 5.  History of sinus bradycardia on low dose beta blocker therapy.   6.  Osteoarthritis of the lower back with chronic low back pain. 7.  Chronic left shoulder pain. 8.  History of tobacco abuse.        TREATMENT PLAN:  1. Reassure. 2.  Continue current medications. 3.  Patient advised to remain active. 4.  Patient advised to follow a healthy cardiac diet and attempt not to gain weight. 5.  Follow up with cardiology in 6 months or on a prn basis. Rachel [address] [place] [address].  [facility] [address] [phone]

## 2020-09-15 RX ORDER — AMLODIPINE BESYLATE 5 MG/1
5 TABLET ORAL DAILY
Qty: 90 TABLET | Refills: 3 | Status: SHIPPED
Start: 2020-09-15 | End: 2021-10-27

## 2020-11-02 ENCOUNTER — TELEPHONE (OUTPATIENT)
Dept: CARDIOLOGY CLINIC | Age: 80
End: 2020-11-02

## 2020-11-02 NOTE — TELEPHONE ENCOUNTER
DR ROSA MAI'S DAUGHTER RADHA CALLED. THE INSURANCE WANTS TO SWITCH HER RANEXA TO A DIFFERENT MEDICATION AT A LOWER COST.  RADHA IS CONCERNED ABOUT THIS SINCE SHE HAS BEEN DOING SO WELL ON THE RANEXA.     PLEASE ADVISE

## 2020-12-08 RX ORDER — METOPROLOL SUCCINATE 100 MG/1
100 TABLET, EXTENDED RELEASE ORAL DAILY
Qty: 90 TABLET | Refills: 5 | Status: SHIPPED | OUTPATIENT
Start: 2020-12-08

## 2021-01-05 RX ORDER — RANOLAZINE 500 MG/1
500 TABLET, EXTENDED RELEASE ORAL 2 TIMES DAILY
Qty: 180 TABLET | Refills: 5 | Status: SHIPPED
Start: 2021-01-05 | End: 2022-02-02

## 2021-03-24 ENCOUNTER — OFFICE VISIT (OUTPATIENT)
Dept: CARDIOLOGY CLINIC | Age: 81
End: 2021-03-24
Payer: MEDICARE

## 2021-03-24 VITALS
HEIGHT: 62 IN | BODY MASS INDEX: 29.7 KG/M2 | HEART RATE: 78 BPM | RESPIRATION RATE: 16 BRPM | DIASTOLIC BLOOD PRESSURE: 64 MMHG | OXYGEN SATURATION: 97 % | WEIGHT: 161.4 LBS | SYSTOLIC BLOOD PRESSURE: 122 MMHG

## 2021-03-24 DIAGNOSIS — I25.2 HISTORY OF NON-ST ELEVATION MYOCARDIAL INFARCTION (NSTEMI): ICD-10-CM

## 2021-03-24 DIAGNOSIS — M54.50 CHRONIC MIDLINE LOW BACK PAIN WITHOUT SCIATICA: ICD-10-CM

## 2021-03-24 DIAGNOSIS — I25.10 CORONARY ARTERY DISEASE INVOLVING NATIVE CORONARY ARTERY OF NATIVE HEART WITHOUT ANGINA PECTORIS: Primary | ICD-10-CM

## 2021-03-24 DIAGNOSIS — I10 ESSENTIAL HYPERTENSION: ICD-10-CM

## 2021-03-24 DIAGNOSIS — E78.5 DYSLIPIDEMIA: ICD-10-CM

## 2021-03-24 DIAGNOSIS — M47.9 OSTEOARTHRITIS OF LOWER BACK: ICD-10-CM

## 2021-03-24 DIAGNOSIS — G89.29 CHRONIC MIDLINE LOW BACK PAIN WITHOUT SCIATICA: ICD-10-CM

## 2021-03-24 DIAGNOSIS — I48.21 ATRIAL FIBRILLATION, PERMANENT (HCC): ICD-10-CM

## 2021-03-24 DIAGNOSIS — I65.23 BILATERAL CAROTID ARTERY STENOSIS: ICD-10-CM

## 2021-03-24 DIAGNOSIS — Z79.01 ANTICOAGULATED BY ANTICOAGULATION TREATMENT: ICD-10-CM

## 2021-03-24 DIAGNOSIS — Z87.891 EX-SMOKER: ICD-10-CM

## 2021-03-24 DIAGNOSIS — M25.512 CHRONIC LEFT SHOULDER PAIN: ICD-10-CM

## 2021-03-24 DIAGNOSIS — G89.29 CHRONIC LEFT SHOULDER PAIN: ICD-10-CM

## 2021-03-24 PROCEDURE — 99213 OFFICE O/P EST LOW 20 MIN: CPT | Performed by: INTERNAL MEDICINE

## 2021-03-24 PROCEDURE — 93000 ELECTROCARDIOGRAM COMPLETE: CPT | Performed by: INTERNAL MEDICINE

## 2021-03-24 SDOH — HEALTH STABILITY: MENTAL HEALTH: HOW MANY STANDARD DRINKS CONTAINING ALCOHOL DO YOU HAVE ON A TYPICAL DAY?: NOT ASKED

## 2021-03-24 NOTE — PROGRESS NOTES
OFFICE VISIT        PRIMARY CARE PHYSICIAN:    Santiago Cornell, III, DO       ALLERGIES / SENSITIVITIES:    Allergies   Allergen Reactions    Other Other (See Comments)     Novocaine--throat swelled up    Sulfa Antibiotics Anaphylaxis        REVIEWED MEDICATIONS:      Current Outpatient Medications:     rivaroxaban (XARELTO) 20 MG TABS tablet, Take 1 tablet by mouth daily (with breakfast), Disp: 90 tablet, Rfl: 3    ranolazine (RANEXA) 500 MG extended release tablet, Take 1 tablet by mouth 2 times daily, Disp: 180 tablet, Rfl: 5    metoprolol succinate (TOPROL XL) 100 MG extended release tablet, Take 1 tablet by mouth daily, Disp: 90 tablet, Rfl: 5    amLODIPine (NORVASC) 5 MG tablet, Take 1 tablet by mouth daily, Disp: 90 tablet, Rfl: 3    traMADol (ULTRAM) 50 MG tablet, Take 50 mg by mouth daily as needed. , Disp: , Rfl:     isosorbide mononitrate (IMDUR) 60 MG extended release tablet, Take 1 tablet by mouth daily, Disp: 90 tablet, Rfl: 3    nitroGLYCERIN (NITROSTAT) 0.4 MG SL tablet, Place 1 tablet under the tongue every 5 minutes as needed for Chest pain, Disp: 25 tablet, Rfl: 3    valsartan-hydrochlorothiazide (DIOVAN-HCT) 160-12.5 MG per tablet, Take 1 tablet by mouth every morning, Disp: 90 tablet, Rfl: 3    atorvastatin (LIPITOR) 40 MG tablet, Take 1 tablet by mouth every morning, Disp: 90 tablet, Rfl: 3    acetaminophen (TYLENOL) 500 MG tablet, Take 500 mg by mouth every 6 hours as needed for Pain, Disp: , Rfl:     Famotidine (PEPCID AC PO), Take 20 mg by mouth daily as needed, Disp: , Rfl:     Menthol, Topical Analgesic, (BIOFREEZE ROLL-ON EX), Apply topically as needed , Disp: , Rfl:     aspirin EC 81 MG EC tablet, Take 81 mg by mouth every morning , Disp: , Rfl:       S: REASON FOR VISIT:    Severe calcific 3 vessel coronary artery disease with history of non ST elevation myocardial infarction in 11/2017: She declined and continues to decline surgical revascularization.   Chronic atrial fibrillation. Tiny Davila is a pleasant, 38-year-old lady with cardiovascular history as described below. She remains stable from a cardiac standpoint. She takes care of her 40-year-old  who has severe dementia. She goes about her daily activities, also doing her housework. She denies chest pain or dyspnea with her daily activities. She denies orthopnea, PND's, lower extremity swelling, palpitations, dizziness, presyncope or syncope. She did get her Covid vaccine. REVIEW OF SYSTEMS:    CONSTITUTIONAL:  Denies fevers, chills, night sweats or fatigue. HEENT:  Denies any unusual headaches.  Denies recent changes in hearing or vision.  Denies dysphagia, hoarseness, hemoptysis, hematemesis or epistaxis. ENDOCRINE:  Denies polyphagia, polydipsia or polyuria.  Denies heat or cold intolerance. MUSCULOSKELETAL:  She has chronic low back pain. SKIN:  Denies rashes, ulcers or itching. HEME/LYMPH:  Denies any palpable lymph nodes. She denies bleeding, but reports easy bruisability.    HEART:  As above. LUNGS:  Denies cough or sputum production. GI: Denies abdominal pain, nausea, vomiting, diarrhea, constipation, rectal bleeding or tarry stools. She does have acid reflux symptoms. :  Denies hematuria or dysuria. PSYCHIATRIC:  Denies mood changes, anxiety or depression. NEUROLOGIC:  Denies memory loss, motor weakness, numbness, tingling or tremors.        CARDIOVASCULAR HISTORY:    1.  Tobacco abuse:  Patient smoked 1/4 PPD x50 years:  Quit 11/20/2017.  2.  Hypertension. 3.  Hyperlipidemia. 4.  Severe three-vessel coronary artery disease:  a.  NSTEMI 11/20/2017.  b. [place] [facility] catheterization 11/21/2017:  Severe calcific three-vessel CAD.  Systemic HTN.  Normal left ventricular size and systolic function.  Elevated LVEDP.    5.  Bilateral carotid stenosis:  a.  Carotid ultrasound done on 05/15/2017 reportedly showered 60-79% stenosis on the right and 0-40% stenosis on the left.   6.  Atrial fibrillation noted during the office visit on 2019, date of onset unclear.       PAST MEDICAL HISTORY:  1.  As under cardiovascular history. 2.  Osteoarthritis of the lower back. 3.  History of chronic left shoulder pain. 4.  Status post appendectomy. 5.  Status post parotidectomy.     FAMILY HISTORY:  Mother  at age 64 from a \"massive heart attack. \" Michael Landin  of prostate cancer.  One sister  of liver cirrhosis secondary to alcohol abuse.  One brother  in his 47's from heart disease.     SOCIAL HISTORY:  Smoked 1/ PPD x50 years, quit 2017. Lynette Mora denies alcohol or illicit drug use. O:  COMPLETE PHYSICAL EXAM:      /64 (Site: Left Upper Arm, Position: Sitting, Cuff Size: Medium Adult)   Pulse 78   Resp 16   Ht 5' 2\" (1.575 m)   Wt 161 lb 6.4 oz (73.2 kg)   SpO2 97%   BMI 29.52 kg/m²      General:          Well-developed, well-nourished, elderly lady in no acute distress. HEENT:           Normocephalic and atraumatic head. Bilateral carotid bruits heard. Carotid upstrokes normal bilaterally.  No thyromegaly.  Sclerae not icteric.  No xanthelasmas.  Mucous membranes moist.  Chest:              Symmetrical and nontender.  No deformities. Lungs:             Clear to auscultation bilaterally. Heart:              Irregularly irregular.  Normal S1 and S2.  No S3.  No murmurs or rubs. Abdomen:        Soft, nontender without organomegaly or masses.  No bruits.  Normal bowel sounds. Extremities:     No edema.  Pulses normal.  Skin:                Normal turgor. No rashes or ulcers noted. Neurologic:      Oriented x3.  No motor or sensory deficits detected. REVIEW OF DIAGNOSTIC TESTS:    1. EKG done today showed atrial fibrillation with a ventricular response rate of 77 bpm with septal infarct pattern and with diffuse T wave abnormality. ASSESSMENT / DIAGNOSIS:   1.  Severe calcific three-vessel coronary artery disease.  Stable with no angina since adding Ranexa.     2.  Bilateral carotid disease. 3.  Permanent atrial fibrillation with controlled ventricular response, anticoagulated on Xarelto.    4.  Hypertension:  Adequately controlled. 5.  Hyperlipidemia:  On statin therapy. 6.  History of sinus bradycardia on low dose beta blocker therapy.   7.  Osteoarthritis of the lower back with chronic low back pain. 8.  Chronic left shoulder pain. 9.  History of tobacco abuse. TREATMENT PLAN:  1. Reassure. 2.  Continue current medications. 3.  Patient advised to remain active. 4.  Patient advised to follow a healthy cardiac diet. 5.  Follow up with Cardiology in 6 months or on a prn basis. Rachel Giron  [place] [id number]  [phone]

## 2021-04-21 ENCOUNTER — TELEPHONE (OUTPATIENT)
Dept: CARDIOLOGY CLINIC | Age: 81
End: 2021-04-21

## 2021-04-21 NOTE — TELEPHONE ENCOUNTER
Pts daughter (on vacation) spoke to pt and pt complains at night when she is laying down Rafael Grass has a funny feeling in her left chest\" You recently saw her 03/21. Pt does have  Afib but daughter  just checking in to see if you have any recommendations.  Please advise

## 2021-05-03 RX ORDER — ATORVASTATIN CALCIUM 40 MG/1
40 TABLET, FILM COATED ORAL EVERY MORNING
Qty: 90 TABLET | Refills: 3 | Status: SHIPPED | OUTPATIENT
Start: 2021-05-03

## 2021-06-03 RX ORDER — ISOSORBIDE MONONITRATE 60 MG/1
TABLET, EXTENDED RELEASE ORAL
Qty: 90 TABLET | Refills: 3 | Status: SHIPPED | OUTPATIENT
Start: 2021-06-03

## 2021-06-22 ENCOUNTER — TELEPHONE (OUTPATIENT)
Dept: CARDIOLOGY CLINIC | Age: 81
End: 2021-06-22

## 2021-06-22 NOTE — TELEPHONE ENCOUNTER
DR ROSA GARCIA CALLED FOR PAU, SHE IS HAVING A LOT OF BACK PAIN. SHE DOES NOT WANT HER MOM TO HAVE THE INJECTIONS BECAUSE SHE FEELS IT IS TO MUCH FOR HER. SHE WANTS TO KNOW WITH HER CURRENT MEDS IF SHE CAN TAKE ORAL PREDNISONE FOR THE PAIN.     PLEASE ADVISE

## 2021-07-06 RX ORDER — VALSARTAN AND HYDROCHLOROTHIAZIDE 160; 12.5 MG/1; MG/1
1 TABLET, FILM COATED ORAL EVERY MORNING
Qty: 90 TABLET | Refills: 3 | Status: SHIPPED | OUTPATIENT
Start: 2021-07-06

## 2021-10-27 ENCOUNTER — APPOINTMENT (OUTPATIENT)
Dept: GENERAL RADIOLOGY | Age: 81
End: 2021-10-27
Payer: MEDICARE

## 2021-10-27 ENCOUNTER — HOSPITAL ENCOUNTER (OUTPATIENT)
Age: 81
Setting detail: OBSERVATION
Discharge: NURSING, REHAB OR LONG-TERM CARE | End: 2021-11-03
Attending: EMERGENCY MEDICINE | Admitting: INTERNAL MEDICINE
Payer: MEDICARE

## 2021-10-27 DIAGNOSIS — R26.81 GAIT INSTABILITY: ICD-10-CM

## 2021-10-27 DIAGNOSIS — S82.032A CLOSED DISPLACED TRANSVERSE FRACTURE OF LEFT PATELLA, INITIAL ENCOUNTER: Primary | ICD-10-CM

## 2021-10-27 DIAGNOSIS — R52 PAIN: ICD-10-CM

## 2021-10-27 PROBLEM — F03.90 DEMENTIA WITHOUT BEHAVIORAL DISTURBANCE (HCC): Chronic | Status: ACTIVE | Noted: 2021-10-27

## 2021-10-27 PROBLEM — M25.569 KNEE PAIN: Status: ACTIVE | Noted: 2021-10-27

## 2021-10-27 PROBLEM — I48.20 CHRONIC ATRIAL FIBRILLATION (HCC): Chronic | Status: ACTIVE | Noted: 2021-10-27

## 2021-10-27 LAB
ANION GAP SERPL CALCULATED.3IONS-SCNC: 10 MMOL/L (ref 7–16)
BASOPHILS ABSOLUTE: 0.04 E9/L (ref 0–0.2)
BASOPHILS RELATIVE PERCENT: 0.4 % (ref 0–2)
BUN BLDV-MCNC: 25 MG/DL (ref 6–23)
CALCIUM SERPL-MCNC: 10.6 MG/DL (ref 8.6–10.2)
CHLORIDE BLD-SCNC: 100 MMOL/L (ref 98–107)
CO2: 23 MMOL/L (ref 22–29)
CREAT SERPL-MCNC: 0.8 MG/DL (ref 0.5–1)
EOSINOPHILS ABSOLUTE: 0.06 E9/L (ref 0.05–0.5)
EOSINOPHILS RELATIVE PERCENT: 0.7 % (ref 0–6)
GFR AFRICAN AMERICAN: >60
GFR NON-AFRICAN AMERICAN: >60 ML/MIN/1.73
GLUCOSE BLD-MCNC: 132 MG/DL (ref 74–99)
HCT VFR BLD CALC: 38.8 % (ref 34–48)
HEMOGLOBIN: 12.8 G/DL (ref 11.5–15.5)
IMMATURE GRANULOCYTES #: 0.01 E9/L
IMMATURE GRANULOCYTES %: 0.1 % (ref 0–5)
LYMPHOCYTES ABSOLUTE: 1.76 E9/L (ref 1.5–4)
LYMPHOCYTES RELATIVE PERCENT: 19.1 % (ref 20–42)
MCH RBC QN AUTO: 30 PG (ref 26–35)
MCHC RBC AUTO-ENTMCNC: 33 % (ref 32–34.5)
MCV RBC AUTO: 90.9 FL (ref 80–99.9)
MONOCYTES ABSOLUTE: 0.87 E9/L (ref 0.1–0.95)
MONOCYTES RELATIVE PERCENT: 9.4 % (ref 2–12)
NEUTROPHILS ABSOLUTE: 6.48 E9/L (ref 1.8–7.3)
NEUTROPHILS RELATIVE PERCENT: 70.3 % (ref 43–80)
PDW BLD-RTO: 14.6 FL (ref 11.5–15)
PLATELET # BLD: 185 E9/L (ref 130–450)
PMV BLD AUTO: 11.3 FL (ref 7–12)
POTASSIUM SERPL-SCNC: 4.5 MMOL/L (ref 3.5–5)
RBC # BLD: 4.27 E12/L (ref 3.5–5.5)
SODIUM BLD-SCNC: 133 MMOL/L (ref 132–146)
WBC # BLD: 9.2 E9/L (ref 4.5–11.5)

## 2021-10-27 PROCEDURE — 85025 COMPLETE CBC W/AUTO DIFF WBC: CPT

## 2021-10-27 PROCEDURE — 99220 PR INITIAL OBSERVATION CARE/DAY 70 MINUTES: CPT | Performed by: INTERNAL MEDICINE

## 2021-10-27 PROCEDURE — 99283 EMERGENCY DEPT VISIT LOW MDM: CPT

## 2021-10-27 PROCEDURE — 73560 X-RAY EXAM OF KNEE 1 OR 2: CPT

## 2021-10-27 PROCEDURE — 6370000000 HC RX 637 (ALT 250 FOR IP): Performed by: NURSE PRACTITIONER

## 2021-10-27 PROCEDURE — 80048 BASIC METABOLIC PNL TOTAL CA: CPT

## 2021-10-27 PROCEDURE — G0378 HOSPITAL OBSERVATION PER HR: HCPCS

## 2021-10-27 RX ORDER — ESCITALOPRAM OXALATE 10 MG/1
10 TABLET ORAL DAILY
COMMUNITY

## 2021-10-27 RX ORDER — HYDROCODONE BITARTRATE AND ACETAMINOPHEN 5; 325 MG/1; MG/1
1 TABLET ORAL ONCE
Status: COMPLETED | OUTPATIENT
Start: 2021-10-27 | End: 2021-10-27

## 2021-10-27 RX ORDER — AMLODIPINE BESYLATE 5 MG/1
TABLET ORAL
Qty: 90 TABLET | Refills: 3 | Status: SHIPPED | OUTPATIENT
Start: 2021-10-27

## 2021-10-27 RX ADMIN — HYDROCODONE BITARTRATE AND ACETAMINOPHEN 1 TABLET: 5; 325 TABLET ORAL at 20:18

## 2021-10-27 ASSESSMENT — PAIN DESCRIPTION - PAIN TYPE: TYPE: ACUTE PAIN

## 2021-10-27 ASSESSMENT — ENCOUNTER SYMPTOMS
SHORTNESS OF BREATH: 0
APNEA: 0
WHEEZING: 0
NAUSEA: 0
ABDOMINAL PAIN: 0
VOMITING: 0
EYE REDNESS: 0
FACIAL SWELLING: 0
COUGH: 0
DIARRHEA: 0
EYE PAIN: 0
CHEST TIGHTNESS: 0

## 2021-10-27 ASSESSMENT — PAIN DESCRIPTION - ORIENTATION: ORIENTATION: LEFT

## 2021-10-27 ASSESSMENT — PAIN DESCRIPTION - LOCATION: LOCATION: KNEE

## 2021-10-27 ASSESSMENT — PAIN SCALES - GENERAL: PAINLEVEL_OUTOF10: 10

## 2021-10-27 ASSESSMENT — PAIN SCALES - WONG BAKER: WONGBAKER_NUMERICALRESPONSE: 6

## 2021-10-28 ENCOUNTER — ANESTHESIA EVENT (OUTPATIENT)
Dept: OPERATING ROOM | Age: 81
End: 2021-10-28
Payer: MEDICARE

## 2021-10-28 PROCEDURE — 2580000003 HC RX 258: Performed by: ORTHOPAEDIC SURGERY

## 2021-10-28 PROCEDURE — 6370000000 HC RX 637 (ALT 250 FOR IP): Performed by: INTERNAL MEDICINE

## 2021-10-28 PROCEDURE — 99225 PR SBSQ OBSERVATION CARE/DAY 25 MINUTES: CPT | Performed by: INTERNAL MEDICINE

## 2021-10-28 PROCEDURE — G0378 HOSPITAL OBSERVATION PER HR: HCPCS

## 2021-10-28 PROCEDURE — 87081 CULTURE SCREEN ONLY: CPT

## 2021-10-28 RX ORDER — SODIUM CHLORIDE 0.9 % (FLUSH) 0.9 %
5-40 SYRINGE (ML) INJECTION PRN
Status: DISCONTINUED | OUTPATIENT
Start: 2021-10-28 | End: 2021-10-29 | Stop reason: HOSPADM

## 2021-10-28 RX ORDER — METOPROLOL SUCCINATE 100 MG/1
100 TABLET, EXTENDED RELEASE ORAL DAILY
Status: DISCONTINUED | OUTPATIENT
Start: 2021-10-28 | End: 2021-11-03 | Stop reason: HOSPADM

## 2021-10-28 RX ORDER — VALSARTAN AND HYDROCHLOROTHIAZIDE 160; 12.5 MG/1; MG/1
1 TABLET, FILM COATED ORAL EVERY MORNING
Status: DISCONTINUED | OUTPATIENT
Start: 2021-10-28 | End: 2021-10-28 | Stop reason: CLARIF

## 2021-10-28 RX ORDER — RANOLAZINE 500 MG/1
500 TABLET, EXTENDED RELEASE ORAL 2 TIMES DAILY
Status: DISCONTINUED | OUTPATIENT
Start: 2021-10-28 | End: 2021-11-03 | Stop reason: HOSPADM

## 2021-10-28 RX ORDER — AMLODIPINE BESYLATE 5 MG/1
5 TABLET ORAL DAILY
Status: DISCONTINUED | OUTPATIENT
Start: 2021-10-28 | End: 2021-11-03 | Stop reason: HOSPADM

## 2021-10-28 RX ORDER — TRAMADOL HYDROCHLORIDE 50 MG/1
50 TABLET ORAL DAILY PRN
Status: DISCONTINUED | OUTPATIENT
Start: 2021-10-28 | End: 2021-11-03 | Stop reason: HOSPADM

## 2021-10-28 RX ORDER — ESCITALOPRAM OXALATE 10 MG/1
10 TABLET ORAL DAILY
Status: DISCONTINUED | OUTPATIENT
Start: 2021-10-28 | End: 2021-11-03 | Stop reason: HOSPADM

## 2021-10-28 RX ORDER — VALSARTAN 160 MG/1
160 TABLET ORAL DAILY
Status: DISCONTINUED | OUTPATIENT
Start: 2021-10-28 | End: 2021-11-03 | Stop reason: HOSPADM

## 2021-10-28 RX ORDER — HYDROCHLOROTHIAZIDE 12.5 MG/1
12.5 TABLET ORAL DAILY
Status: DISCONTINUED | OUTPATIENT
Start: 2021-10-28 | End: 2021-11-03 | Stop reason: HOSPADM

## 2021-10-28 RX ORDER — ATORVASTATIN CALCIUM 40 MG/1
40 TABLET, FILM COATED ORAL EVERY MORNING
Status: DISCONTINUED | OUTPATIENT
Start: 2021-10-28 | End: 2021-11-03 | Stop reason: HOSPADM

## 2021-10-28 RX ORDER — ACETAMINOPHEN 500 MG
500 TABLET ORAL EVERY 6 HOURS PRN
Status: DISCONTINUED | OUTPATIENT
Start: 2021-10-28 | End: 2021-11-03 | Stop reason: HOSPADM

## 2021-10-28 RX ORDER — SODIUM CHLORIDE 0.9 % (FLUSH) 0.9 %
5-40 SYRINGE (ML) INJECTION EVERY 12 HOURS SCHEDULED
Status: DISCONTINUED | OUTPATIENT
Start: 2021-10-28 | End: 2021-10-29 | Stop reason: HOSPADM

## 2021-10-28 RX ORDER — ISOSORBIDE MONONITRATE 60 MG/1
60 TABLET, EXTENDED RELEASE ORAL DAILY
Status: DISCONTINUED | OUTPATIENT
Start: 2021-10-28 | End: 2021-11-03 | Stop reason: HOSPADM

## 2021-10-28 RX ORDER — SODIUM CHLORIDE 9 MG/ML
25 INJECTION, SOLUTION INTRAVENOUS PRN
Status: DISCONTINUED | OUTPATIENT
Start: 2021-10-28 | End: 2021-10-29 | Stop reason: HOSPADM

## 2021-10-28 RX ORDER — FAMOTIDINE 20 MG/1
20 TABLET, FILM COATED ORAL DAILY
Status: DISCONTINUED | OUTPATIENT
Start: 2021-10-28 | End: 2021-10-29 | Stop reason: SDUPTHER

## 2021-10-28 RX ORDER — ASPIRIN 81 MG/1
81 TABLET ORAL EVERY MORNING
Status: DISCONTINUED | OUTPATIENT
Start: 2021-10-28 | End: 2021-11-03 | Stop reason: HOSPADM

## 2021-10-28 RX ADMIN — VALSARTAN 160 MG: 160 TABLET, FILM COATED ORAL at 08:39

## 2021-10-28 RX ADMIN — ESCITALOPRAM OXALATE 10 MG: 10 TABLET ORAL at 08:39

## 2021-10-28 RX ADMIN — Medication 10 ML: at 20:42

## 2021-10-28 RX ADMIN — HYDROCHLOROTHIAZIDE 12.5 MG: 12.5 TABLET ORAL at 08:39

## 2021-10-28 RX ADMIN — METOPROLOL SUCCINATE 100 MG: 100 TABLET, EXTENDED RELEASE ORAL at 08:39

## 2021-10-28 RX ADMIN — ATORVASTATIN CALCIUM 40 MG: 40 TABLET, FILM COATED ORAL at 08:39

## 2021-10-28 RX ADMIN — ISOSORBIDE MONONITRATE 60 MG: 60 TABLET, EXTENDED RELEASE ORAL at 08:39

## 2021-10-28 RX ADMIN — ASPIRIN 81 MG: 81 TABLET, COATED ORAL at 08:39

## 2021-10-28 RX ADMIN — TRAMADOL HYDROCHLORIDE 50 MG: 50 TABLET ORAL at 11:46

## 2021-10-28 RX ADMIN — FAMOTIDINE 20 MG: 20 TABLET, FILM COATED ORAL at 08:39

## 2021-10-28 RX ADMIN — RANOLAZINE 500 MG: 500 TABLET, FILM COATED, EXTENDED RELEASE ORAL at 08:39

## 2021-10-28 RX ADMIN — AMLODIPINE BESYLATE 5 MG: 5 TABLET ORAL at 08:39

## 2021-10-28 ASSESSMENT — PAIN DESCRIPTION - PAIN TYPE: TYPE: ACUTE PAIN

## 2021-10-28 ASSESSMENT — PAIN DESCRIPTION - ONSET: ONSET: GRADUAL

## 2021-10-28 ASSESSMENT — PAIN DESCRIPTION - DESCRIPTORS: DESCRIPTORS: ACHING;CONSTANT;DISCOMFORT

## 2021-10-28 ASSESSMENT — PAIN DESCRIPTION - PROGRESSION: CLINICAL_PROGRESSION: GRADUALLY WORSENING

## 2021-10-28 ASSESSMENT — PAIN DESCRIPTION - ORIENTATION: ORIENTATION: LEFT

## 2021-10-28 ASSESSMENT — PAIN DESCRIPTION - FREQUENCY: FREQUENCY: INTERMITTENT

## 2021-10-28 ASSESSMENT — LIFESTYLE VARIABLES: SMOKING_STATUS: 0

## 2021-10-28 ASSESSMENT — PAIN SCALES - GENERAL: PAINLEVEL_OUTOF10: 7

## 2021-10-28 ASSESSMENT — PAIN DESCRIPTION - LOCATION: LOCATION: KNEE

## 2021-10-28 NOTE — ED PROVIDER NOTES
ED Attending shared visit  CC: Krysten      [facility]  Department of Emergency Medicine   ED  Encounter Note  Admit Date/RoomTime: 10/27/2021  7:49 PM  ED Room: 35/35    NAME: Lina Brown  : 1940  MRN: [id number]     Chief Complaint:  Knee Pain (tripped and fell injuring left knee tonight)    History of Present Illness       Lina Brown is a 78 y.o. old female who presents to the emergency department by ambulance, for pain located anterior to left knee  which occured 1 hour(s) prior to arrival.  The complaint is due to tripping over someone's wheelchair at the [facility]. Since onset the symptoms have been persistent. Patient has no prior history of pain/injury with regards to today's visit. Her pain is aggraveated by any movement or walking and relieved by nothing. Her weight bearing ability is: difficult secondary to discomfort. She denies any head injury, headache, loss of consciousness, confusion, dizziness, neck pain, chest pain, abdominal pain, back pain, numbness, weakness, blurred vision, nausea, vomiting, fever, chills, wounds or rash. ROS   Pertinent positives and negatives are stated within HPI, all other systems reviewed and are negative. Past Medical History:  has a past medical history of Atrial fibrillation ([address] Coronary artery disease involving native coronary artery of native heart without angina pectoris, Hyperlipidemia, Hypertension, NSTEMI (non-ST elevated myocardial infarction) ([address] Osteoarthritis, and Shoulder pain. Surgical History:  has a past surgical history that includes Appendectomy; Parotidectomy; and Cardiac catheterization (2017). Social History:  reports that she quit smoking about 3 years ago. Her smoking use included cigarettes. She has a 42.75 pack-year smoking history. She has never used smokeless tobacco. She reports previous alcohol use. She reports that she does not use drugs.     Family History: family history includes Alcohol Abuse in her brother, father, and sister; Early Death in her mother and sister; Heart Disease in her brother and mother; Prostate Cancer in her father. Allergies: Other and Sulfa antibiotics    Physical Exam   Oxygen Saturation Interpretation: Normal.        ED Triage Vitals [10/27/21 2006]   BP Temp Temp src Pulse Resp SpO2 Height Weight   (!) 140/76 98.3 °F (36.8 °C) -- 95 18 98 % 5' 2\" (1.575 m) 145 lb (65.8 kg)         Constitutional:  Alert, development consistent with age. Neck:  Normal ROM. Supple. Left Knee: anterior            Tenderness:  mild. .              Swelling/Effusion: Moderate. Deformity: no deformity observed/palpated. ROM: diminished range with pain. Skin:  no wounds, erythema, or swelling. Hip:            Tenderness:  none. Swelling: None. Deformity: no.              ROM: full range of motion. Skin:  no wounds, erythema, or swelling. Joint(s) Above/Below: ankle. Tenderness:  none. Swelling: No.              Deformity: no deformity observed/palpated. ROM: full range of motion. Skin:  no wounds, erythema, or swelling. Neurovascular: Motor deficit: none. Sensory deficit: none. Pulse deficit: none. Capillary refill: normal.  Gait:  limp due to affected limb. Lymphatics: No lymphangitis or adenopathy noted. Neurological:  Oriented. Motor functions intact. Lab / Imaging Results   (All laboratory and radiology results have been personally reviewed by myself)  Labs:  No results found for this visit on 10/27/21. Imaging: All Radiology results interpreted by Radiologist unless otherwise noted. XR KNEE LEFT (1-2 VIEWS)   Final Result   Displaced mid patellar fracture with prepatellar soft tissue edema. Small joint effusion.            ED Course / Medical Decision Making     Medications HYDROcodone-acetaminophen (NORCO) 5-325 MG per tablet 1 tablet (1 tablet Oral Given 10/27/21 2018)        Consult(s):   IP CONSULT TO ORTHOPEDIC SURGERY  2215: Spoke with Dr. Danisha Easley and he will be on consult for orthopedic surgery. 2225: Spoke with [facility] hospitalist and the patient will be admitted for further treatment and observation. Procedure(s):   none. 2120: Patient resting in no distress. Patient's daughter does not feel comfortable taking her home with the left patellar fracture. MDM:     Patient presented after stated mechanical fall with left anterior knee pain. She was found to have a displaced left patellar fracture. After speaking with the patient's daughter they will not be able to care for her at home. The patient is a not able to ambulate and lives alone. Orthopedic surgery and internal medicine were consulted. The patient will be admitted to a general floor for further treatment and observation. Plan of Care/Counseling:  SKY Ross CNP reviewed today's visit with the patient in addition to providing specific details for the plan of care and counseling regarding the diagnosis and prognosis. Questions are answered at this time and are agreeable with the plan. Assessment      1. Closed displaced transverse fracture of left patella, initial encounter    2. Gait instability      Plan   Admit to general medical.  Patient condition is stable    New Medications     New Prescriptions    No medications on file     Electronically signed by SKY Ross CNP   DD: 10/27/21  **This report was transcribed using voice recognition software. Every effort was made to ensure accuracy; however, inadvertent computerized transcription errors may be present.   END OF ED PROVIDER NOTE       SKY Cunningham CNP  10/27/21 5829

## 2021-10-28 NOTE — CONSULTS
S:  79 y/o retiree admitted after low energy fall onto L knee [facility]. Previous Ind ambulator. No prior L knee surgery. Pain with wt bearing and unable to actively extend L knee. PMH:  Past Medical History[]Expand by Default        Past Medical History:   Diagnosis Date    [address] [address]      Coronary artery disease involving native coronary artery of native heart without angina pectoris 11/30/2017    Hyperlipidemia      Hypertension      NSTEMI (non-ST elevated myocardial infarction) ([facility]      Osteoarthritis      Shoulder pain              Surgical History:  Past Surgical History[]Expand by Default         Past Surgical History:   Procedure Laterality Date    APPENDECTOMY        CARDIAC CATHETERIZATION   11/20/2017     Severe Calcific 3 vessel CAD. LV function size and systolic function. Elevated left ventricular end -diastolic pressures consistent with LV diastolic dysfunction.  PAROTIDECTOMY          Medications Prior to Admission:    Home Medications[]Expand by Default           Prior to Admission medications    Medication Sig Start Date End Date Taking?  Authorizing Provider   escitalopram (LEXAPRO) 10 MG tablet Take 10 mg by mouth daily     Yes Historical Provider, MD   amLODIPine (NORVASC) 5 MG tablet TAKE ONE TABLET BY MOUTH EVERY DAY 10/27/21     Av Lovett MD   valsartan-hydroCHLOROthiazide (DIOVAN-HCT) 160-12.5 MG per tablet Take 1 tablet by mouth every morning 7/6/21     John Hernandez MD   isosorbide mononitrate (IMDUR) 60 MG extended release tablet TAKE ONE TABLET BY MOUTH EVERY DAY 6/3/21     John Hernandez MD   atorvastatin (LIPITOR) 40 MG tablet Take 1 tablet by mouth every morning 5/3/21     John Hernandez MD   rivaroxaban (XARELTO) 20 MG TABS tablet Take 1 tablet by mouth daily (with breakfast) 3/15/21     Av Lovett MD   ranolazine (RANEXA) 500 MG extended release tablet Take 1 tablet by mouth 2 times daily 1/5/21     John Hernandez MD metoprolol succinate (TOPROL XL) 100 MG extended release tablet Take 1 tablet by mouth daily 12/8/20     Salomon Granda MD   traMADol (ULTRAM) 50 MG tablet Take 50 mg by mouth daily as needed.        Historical Provider, MD   nitroGLYCERIN (NITROSTAT) 0.4 MG SL tablet Place 1 tablet under the tongue every 5 minutes as needed for Chest pain 6/2/20     Slaomon Granda MD   acetaminophen (TYLENOL) 500 MG tablet Take 500 mg by mouth every 6 hours as needed for Pain       Historical Provider, MD   Famotidine (PEPCID AC PO) Take 20 mg by mouth daily as needed       Historical Provider, MD   Menthol, Topical Analgesic, (BIOFREEZE ROLL-ON EX) Apply topically as needed        Historical Provider, MD   aspirin EC 81 MG EC tablet Take 81 mg by mouth every morning        Historical Provider, MD            Allergies: Other and Sulfa antibiotics     Social History:    reports that she quit smoking about 3 years ago. Her smoking use included cigarettes. She has a 42.75 pack-year smoking history. She has never used smokeless tobacco. She reports previous alcohol use. She reports that she does not use drugs.     Family History:   family history includes Alcohol Abuse in her brother, father, and sister; Early Death in her mother and sister; Heart Disease in her brother and mother; Prostate Cancer in her father. O:  Alert and oriented to event. Unable to recall date  97.7, 58,16,142/100, 62%  Normocephalic  Breathing non-labored  RR  Abd - soft/NT  +L knee effusion  Absent L ext mechanism fxn  Neg Homans  Min p.edema  No pain with ROM B UE or RLE    X-ray:  Transverse, comminuted L patella fx    Labs:  Cr 0.8, Hgb 12.8, platelets 803, glucose 132    A:  Closed displaced L patella fx    P:  The nature and prognosis of injury discussed with patient and daughter. Recommended tx is ORIF. Reviewed surgery and expected post -op course.   Risks include but not limited to SSI, failure of fixation, DVT/PE, injury to NV structures and

## 2021-10-28 NOTE — CARE COORDINATION
Met with patient and daughter, Yair Moise, about diagnosis and discharge plan of care. Pt is forgetful. [place] and has patella fx, left. Plan is for OR 10/29. Pt lives alone in [address] with 2 steps in. Has all DME. No home care services. Family want [facility] post op. 1st choice is [facility], 2nd) [address] Referral called to [address], liaison, at [place]. Pt may qualify click-6 or will need possible auth. PCP is Dr Maryann Infante. Will follow-o    [facility] accepted pt and will need rapid covid prior to discharge.  Will need to complete paperwork and possible auth-o
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
## 2021-10-28 NOTE — ANESTHESIA PRE PROCEDURE
Menthol, Topical Analgesic, (BIOFREEZE ROLL-ON EX) Apply topically as needed     Historical Provider, MD       Current medications:    Current Facility-Administered Medications   Medication Dose Route Frequency Provider Last Rate Last Admin    acetaminophen (TYLENOL) tablet 500 mg  500 mg Oral Q6H PRN Anival Shape, DO        amLODIPine (NORVASC) tablet 5 mg  5 mg Oral Daily Miguelangel Gasca, DO   5 mg at 10/28/21 0839    aspirin EC tablet 81 mg  81 mg Oral QAM Miguelangel Gasca, DO   81 mg at 10/28/21 0839    atorvastatin (LIPITOR) tablet 40 mg  40 mg Oral QAM Miguelangel Gasca, DO   40 mg at 10/28/21 0839    escitalopram (LEXAPRO) tablet 10 mg  10 mg Oral Daily Anival Shape, DO   10 mg at 10/28/21 0839    famotidine (PEPCID) tablet 20 mg  20 mg Oral Daily Anival Shape, DO   20 mg at 10/28/21 6194    isosorbide mononitrate (IMDUR) extended release tablet 60 mg  60 mg Oral Daily Anival Shape, DO   60 mg at 10/28/21 0272    metoprolol succinate (TOPROL XL) extended release tablet 100 mg  100 mg Oral Daily Anival Shape, DO   100 mg at 10/28/21 5564    ranolazine (RANEXA) extended release tablet 500 mg  500 mg Oral BID Anival Shape, DO   500 mg at 10/28/21 5505    rivaroxaban (XARELTO) tablet 20 mg  20 mg Oral Daily with breakfast Anival Shape, DO        traMADol (ULTRAM) tablet 50 mg  50 mg Oral Daily PRN Anival Shape, DO   50 mg at 10/28/21 1146    valsartan (DIOVAN) tablet 160 mg  160 mg Oral Daily Anival Shape, DO   160 mg at 10/28/21 5652    And    hydroCHLOROthiazide (HYDRODIURIL) tablet 12.5 mg  12.5 mg Oral Daily Miguelangel Gasca, DO   12.5 mg at 10/28/21 9566       Allergies:     Allergies   Allergen Reactions    Other Other (See Comments)     Novocaine--throat swelled up    Sulfa Antibiotics Anaphylaxis       Problem List:    Patient Active Problem List   Diagnosis Code    NSTEMI (non-ST elevated myocardial infarction) ([facility] [address] I21.4    Essential hypertension I10    Hyperlipidemia LDL goal <100 E78.5    Coronary artery disease involving native coronary artery of native heart without angina pectoris I25.10    Osteoarthritis of lumbar spine M47.816    Chronic left shoulder pain M25.512, G89.29    History of tobacco abuse Z87.891    Sinus bradycardia R00.1    H/O parotidectomy Z90.49    History of appendectomy Z90.49    Chronic midline low back pain M54.50, G89.29    Gastroesophageal reflux disease K21.9    Knee pain M25.569    Chronic atrial fibrillation (HCC) I48.20    Dementia without behavioral disturbance (HCC) F03.90       Past Medical History:        Diagnosis Date    Atrial fibrillation ([facility] [address]     Coronary artery disease involving native coronary artery of native heart without angina pectoris 11/30/2017    Hyperlipidemia     Hypertension     NSTEMI (non-ST elevated myocardial infarction) ([facility] [address]     Osteoarthritis     Shoulder pain        Past Surgical History:        Procedure Laterality Date    APPENDECTOMY      CARDIAC CATHETERIZATION  11/20/2017    Severe Calcific 3 vessel CAD. LV function size and systolic function. Elevated left ventricular end -diastolic pressures consistent with LV diastolic dysfunction.       PAROTIDECTOMY         Social History:    Social History     Tobacco Use    Smoking status: Former Smoker     Packs/day: 0.75     Years: 57.00     Pack years: 42.75     Types: Cigarettes     Quit date: 11/22/2017     Years since quitting: 3.9    Smokeless tobacco: Never Used   Substance Use Topics    Alcohol use: Not Currently                                Counseling given: Not Answered      Vital Signs (Current):   Vitals:    10/27/21 2006 10/27/21 2346 10/28/21 0830   BP: (!) 140/76 (!) 142/100 127/61   Pulse: 95 58 93   Resp: 18 16 16   Temp: 36.8 °C (98.3 °F) 36.5 °C (97.7 °F) 36.5 °C (97.7 °F)   TempSrc:  Oral Oral   SpO2: 98% 97% 97%   Weight: 145 lb (65.8 kg)     Height: 5' 2\" (1.575 m) BP Readings from Last 3 Encounters:   10/28/21 127/61   03/24/21 122/64   09/14/20 118/60       NPO Status:   Pt instructed to be NPO at midnight                                                                               BMI:   Wt Readings from Last 3 Encounters:   10/27/21 145 lb (65.8 kg)   03/24/21 161 lb 6.4 oz (73.2 kg)   09/14/20 160 lb 6.4 oz (72.8 kg)     Body mass index is 26.52 kg/m². CBC:   Lab Results   Component Value Date    WBC 9.2 10/27/2021    RBC 4.27 10/27/2021    HGB 12.8 10/27/2021    HCT 38.8 10/27/2021    MCV 90.9 10/27/2021    RDW 14.6 10/27/2021     10/27/2021       CMP:   Lab Results   Component Value Date     10/27/2021    K 4.5 10/27/2021     10/27/2021    CO2 23 10/27/2021    BUN 25 10/27/2021    CREATININE 0.8 10/27/2021    GFRAA >60 10/27/2021    LABGLOM >60 10/27/2021    GLUCOSE 132 10/27/2021    PROT 6.5 11/21/2017    CALCIUM 10.6 10/27/2021    BILITOT 0.5 11/21/2017    ALKPHOS 94 11/21/2017    AST 20 11/21/2017    ALT 14 11/21/2017       POC Tests: No results for input(s): POCGLU, POCNA, POCK, POCCL, POCBUN, POCHEMO, POCHCT in the last 72 hours.     Coags:   Lab Results   Component Value Date    PROTIME 12.7 11/21/2017    INR 1.2 11/21/2017    APTT 75.5 11/21/2017       HCG (If Applicable): No results found for: PREGTESTUR, PREGSERUM, HCG, HCGQUANT     ABGs: No results found for: PHART, PO2ART, KIP3BPP, TRS0UEP, BEART, V2XQUZRQ     Type & Screen (If Applicable):  No results found for: LABABO, LABRH    Drug/Infectious Status (If Applicable):  No results found for: HIV, HEPCAB    COVID-19 Screening (If Applicable): No results found for: COVID19        Anesthesia Evaluation  Patient summary reviewed and Nursing notes reviewed no history of anesthetic complications:   Airway: Mallampati: III  TM distance: >3 FB   Neck ROM: full  Mouth opening: > = 3 FB Dental:    (+) upper dentures  Comment: Pt denies any other loose missing chipped teeth Pulmonary: breath sounds clear to auscultation      (-) not a current smoker (smoked for 43 pack years)                          ROS comment: Pt is poor historian. Cardiovascular:  Exercise tolerance: poor (<4 METS),   (+) hypertension: moderate, past MI: > 6 months and no interval change, CAD: obstructive, dysrhythmias: atrial fibrillation, hyperlipidemia    (-)  angina    ECG reviewed  Rhythm: irregular  Rate: normal  Echocardiogram reviewed         Beta Blocker:  Dose within 24 Hrs         Neuro/Psych:   (+) psychiatric history (dementia):depression/anxiety             GI/Hepatic/Renal:   (+) GERD: well controlled,           Endo/Other:    (+) blood dyscrasia: anticoagulation therapy, arthritis: OA., malignancy/cancer. Abdominal:             Vascular:   + PVD, aortic or cerebral (R carotid stenosis 60-80%), . Other Findings:           Anesthesia Plan      general and regional     ASA 3       Induction: intravenous. BIS  MIPS: Postoperative opioids intended and Prophylactic antiemetics administered. Anesthetic plan and risks discussed with patient. Use of blood products discussed with patient whom. Plan discussed with attending and CRNA. Skinny Vale RN   10/28/2021      EKG 3/24/21  Atrial fibrillation   -Anteroseptal infarct -age undetermined.    -Nonspecific ST depression   +   T-abnormality  -Nondiagnostic  -Possible  Anterolateral  ischemia. CARDIAC CATH 11/21/17  CONCLUSIONS:  1. Severe calcific 3-vessel coronary artery disease as described above. 2.  Systemic hypertension. 3.  Normal left ventricular size and systolic function. 4.  Elevated left ventricular end-diastolic pressure consistent with LV  diastolic dysfunction.     LEFT VENTRICULOGRAPHY:  The left ventricle is normal in size and  hyperdynamic in contractility with an estimated ejection fraction of 75%. There was trivial post PVC mitral regurgitation.       CAROTID STENOSIS 5/15/2017 Impression:       Right internal carotid artery: 60 - 79% stenosis.       Left internal carotid artery: 0 - 40% stenosis. DOS STAFF ADDENDUM:    Patient seen and examined, physical exam updated as needed, chart reviewed. NPO status confirmed. Anesthetic options and risks discussed with patient/legal guardian. Patient/legal guardian verbalized understanding and agrees to proceed.      Maurilio Bhandari MD  Staff Anesthesiologist  October 29, 2021  6:41 AM

## 2021-10-28 NOTE — PROGRESS NOTES
[facility] Progress Note    Admitting Date and Time: 10/27/2021  7:49 PM  Admit Dx: Knee pain [M25.569]  Gait instability [R26.81]  Closed displaced transverse fracture of left patella, initial encounter [S82.032A]    Subjective:  Patient is being followed for Knee pain [M25.569]  Gait instability [R26.81]  Closed displaced transverse fracture of left patella, initial encounter [S82.032A]   Pt feels pain is controlled  Per RN: No new reports    ROS: denies fever, chills, cp, sob, n/v, HA unless stated above. Patient moved her bowels this morning     amLODIPine  5 mg Oral Daily    aspirin EC  81 mg Oral QAM    atorvastatin  40 mg Oral QAM    escitalopram  10 mg Oral Daily    famotidine  20 mg Oral Daily    isosorbide mononitrate  60 mg Oral Daily    metoprolol succinate  100 mg Oral Daily    ranolazine  500 mg Oral BID    rivaroxaban  20 mg Oral Daily with breakfast    valsartan  160 mg Oral Daily    And    hydroCHLOROthiazide  12.5 mg Oral Daily     acetaminophen, 500 mg, Q6H PRN  traMADol, 50 mg, Daily PRN         Objective: Mews score is 1 based on vital signs as well as respiratory rate    /61   Pulse 93   Temp 97.7 °F (36.5 °C) (Oral)   Resp 16   Ht 5' 2\" (1.575 m)   Wt 145 lb (65.8 kg)   SpO2 97%   BMI 26.52 kg/m²   Physical Exam  Vitals reviewed. Constitutional:       Appearance: She is not diaphoretic. HENT:      Head:      Comments: Sinuses are not tender to percussion or palpation there is no thrush     Nose: Nose normal.      Mouth/Throat:      Mouth: Mucous membranes are moist.      Pharynx: No oropharyngeal exudate. Eyes:      General: No scleral icterus. Extraocular Movements: Extraocular movements intact. Cardiovascular:      Heart sounds: No friction rub. Comments: s1 s2 audible pulses regular  Pulmonary:      Effort: Pulmonary effort is normal.      Breath sounds: Normal breath sounds. No wheezing or rhonchi.    Abdominal:      General: and atorvastatin for treatment of her coronary disease    Patient's daughter is present in the room during my visit the questions and concerns were addressed and answered in the indicated satisfaction with this  NOTE: This report was transcribed using voice recognition software. Every effort was made to ensure accuracy; however, inadvertent computerized transcription errors may be present.   Electronically signed by Iker Palmer DO on 10/28/2021 at 10:19 AM

## 2021-10-29 ENCOUNTER — APPOINTMENT (OUTPATIENT)
Dept: GENERAL RADIOLOGY | Age: 81
End: 2021-10-29
Payer: MEDICARE

## 2021-10-29 ENCOUNTER — ANESTHESIA (OUTPATIENT)
Dept: OPERATING ROOM | Age: 81
End: 2021-10-29
Payer: MEDICARE

## 2021-10-29 VITALS
DIASTOLIC BLOOD PRESSURE: 56 MMHG | OXYGEN SATURATION: 97 % | RESPIRATION RATE: 1 BRPM | TEMPERATURE: 97 F | SYSTOLIC BLOOD PRESSURE: 118 MMHG

## 2021-10-29 PROBLEM — S82.032A DISPLACED TRANSVERSE FRACTURE OF LEFT PATELLA, INITIAL ENCOUNTER FOR CLOSED FRACTURE: Status: ACTIVE | Noted: 2021-10-29

## 2021-10-29 LAB
ACANTHOCYTES: ABNORMAL
ANION GAP SERPL CALCULATED.3IONS-SCNC: 14 MMOL/L (ref 7–16)
ANISOCYTOSIS: ABNORMAL
BASOPHILS ABSOLUTE: 0.05 E9/L (ref 0–0.2)
BASOPHILS RELATIVE PERCENT: 0.4 % (ref 0–2)
BUN BLDV-MCNC: 16 MG/DL (ref 6–23)
BURR CELLS: ABNORMAL
CALCIUM SERPL-MCNC: 10.5 MG/DL (ref 8.6–10.2)
CHLORIDE BLD-SCNC: 99 MMOL/L (ref 98–107)
CO2: 23 MMOL/L (ref 22–29)
CREAT SERPL-MCNC: 0.7 MG/DL (ref 0.5–1)
EOSINOPHILS ABSOLUTE: 0.03 E9/L (ref 0.05–0.5)
EOSINOPHILS RELATIVE PERCENT: 0.2 % (ref 0–6)
GFR AFRICAN AMERICAN: >60
GFR NON-AFRICAN AMERICAN: >60 ML/MIN/1.73
GLUCOSE BLD-MCNC: 104 MG/DL (ref 74–99)
HCT VFR BLD CALC: 37 % (ref 34–48)
HEMOGLOBIN: 12.3 G/DL (ref 11.5–15.5)
IMMATURE GRANULOCYTES #: 0.07 E9/L
IMMATURE GRANULOCYTES %: 0.5 % (ref 0–5)
INR BLD: 1.3
LYMPHOCYTES ABSOLUTE: 2.05 E9/L (ref 1.5–4)
LYMPHOCYTES RELATIVE PERCENT: 14.6 % (ref 20–42)
MCH RBC QN AUTO: 29.8 PG (ref 26–35)
MCHC RBC AUTO-ENTMCNC: 33.2 % (ref 32–34.5)
MCV RBC AUTO: 89.6 FL (ref 80–99.9)
MONOCYTES ABSOLUTE: 1.9 E9/L (ref 0.1–0.95)
MONOCYTES RELATIVE PERCENT: 13.5 % (ref 2–12)
NEUTROPHILS ABSOLUTE: 9.93 E9/L (ref 1.8–7.3)
NEUTROPHILS RELATIVE PERCENT: 70.8 % (ref 43–80)
OVALOCYTES: ABNORMAL
PDW BLD-RTO: 14.4 FL (ref 11.5–15)
PLATELET # BLD: 184 E9/L (ref 130–450)
PMV BLD AUTO: 12.4 FL (ref 7–12)
POIKILOCYTES: ABNORMAL
POLYCHROMASIA: ABNORMAL
POTASSIUM REFLEX MAGNESIUM: 3.8 MMOL/L (ref 3.5–5)
PROTHROMBIN TIME: 14.8 SEC (ref 9.3–12.4)
RBC # BLD: 4.13 E12/L (ref 3.5–5.5)
SODIUM BLD-SCNC: 136 MMOL/L (ref 132–146)
WBC # BLD: 14 E9/L (ref 4.5–11.5)

## 2021-10-29 PROCEDURE — G0378 HOSPITAL OBSERVATION PER HR: HCPCS

## 2021-10-29 PROCEDURE — 36415 COLL VENOUS BLD VENIPUNCTURE: CPT

## 2021-10-29 PROCEDURE — 97530 THERAPEUTIC ACTIVITIES: CPT

## 2021-10-29 PROCEDURE — 2720000010 HC SURG SUPPLY STERILE: Performed by: ORTHOPAEDIC SURGERY

## 2021-10-29 PROCEDURE — 3700000000 HC ANESTHESIA ATTENDED CARE: Performed by: ORTHOPAEDIC SURGERY

## 2021-10-29 PROCEDURE — 2580000003 HC RX 258: Performed by: ORTHOPAEDIC SURGERY

## 2021-10-29 PROCEDURE — 2500000003 HC RX 250 WO HCPCS: Performed by: NURSE ANESTHETIST, CERTIFIED REGISTERED

## 2021-10-29 PROCEDURE — 7100000000 HC PACU RECOVERY - FIRST 15 MIN: Performed by: ORTHOPAEDIC SURGERY

## 2021-10-29 PROCEDURE — 7100000001 HC PACU RECOVERY - ADDTL 15 MIN: Performed by: ORTHOPAEDIC SURGERY

## 2021-10-29 PROCEDURE — 6370000000 HC RX 637 (ALT 250 FOR IP): Performed by: ORTHOPAEDIC SURGERY

## 2021-10-29 PROCEDURE — 6360000002 HC RX W HCPCS: Performed by: ANESTHESIOLOGY

## 2021-10-29 PROCEDURE — C1713 ANCHOR/SCREW BN/BN,TIS/BN: HCPCS | Performed by: ORTHOPAEDIC SURGERY

## 2021-10-29 PROCEDURE — 80048 BASIC METABOLIC PNL TOTAL CA: CPT

## 2021-10-29 PROCEDURE — 2500000003 HC RX 250 WO HCPCS: Performed by: ORTHOPAEDIC SURGERY

## 2021-10-29 PROCEDURE — 3700000001 HC ADD 15 MINUTES (ANESTHESIA): Performed by: ORTHOPAEDIC SURGERY

## 2021-10-29 PROCEDURE — 2500000003 HC RX 250 WO HCPCS: Performed by: ANESTHESIOLOGY

## 2021-10-29 PROCEDURE — 3600000012 HC SURGERY LEVEL 2 ADDTL 15MIN: Performed by: ORTHOPAEDIC SURGERY

## 2021-10-29 PROCEDURE — 97162 PT EVAL MOD COMPLEX 30 MIN: CPT

## 2021-10-29 PROCEDURE — 3209999900 FLUORO FOR SURGICAL PROCEDURES

## 2021-10-29 PROCEDURE — 6360000002 HC RX W HCPCS: Performed by: NURSE ANESTHETIST, CERTIFIED REGISTERED

## 2021-10-29 PROCEDURE — 2580000003 HC RX 258: Performed by: NURSE ANESTHETIST, CERTIFIED REGISTERED

## 2021-10-29 PROCEDURE — 6360000002 HC RX W HCPCS: Performed by: ORTHOPAEDIC SURGERY

## 2021-10-29 PROCEDURE — C1769 GUIDE WIRE: HCPCS | Performed by: ORTHOPAEDIC SURGERY

## 2021-10-29 PROCEDURE — 3600000002 HC SURGERY LEVEL 2 BASE: Performed by: ORTHOPAEDIC SURGERY

## 2021-10-29 PROCEDURE — 2709999900 HC NON-CHARGEABLE SUPPLY: Performed by: ORTHOPAEDIC SURGERY

## 2021-10-29 PROCEDURE — 85025 COMPLETE CBC W/AUTO DIFF WBC: CPT

## 2021-10-29 PROCEDURE — 64447 NJX AA&/STRD FEMORAL NRV IMG: CPT | Performed by: ANESTHESIOLOGY

## 2021-10-29 PROCEDURE — 97165 OT EVAL LOW COMPLEX 30 MIN: CPT

## 2021-10-29 PROCEDURE — 99225 PR SBSQ OBSERVATION CARE/DAY 25 MINUTES: CPT | Performed by: FAMILY MEDICINE

## 2021-10-29 PROCEDURE — 85610 PROTHROMBIN TIME: CPT

## 2021-10-29 DEVICE — SCREW BNE L40MM DIA4MM S STL CANN LNG HALF THRD SM HEX SOCK: Type: IMPLANTABLE DEVICE | Site: PATELLA | Status: FUNCTIONAL

## 2021-10-29 DEVICE — SCREW BNE L40MM DIA4MM S STL CANN SHT 1/3 THRD SM HEX SOCK: Type: IMPLANTABLE DEVICE | Site: PATELLA | Status: FUNCTIONAL

## 2021-10-29 RX ORDER — DEXAMETHASONE SODIUM PHOSPHATE 10 MG/ML
4 INJECTION, SOLUTION INTRAMUSCULAR; INTRAVENOUS ONCE
Status: COMPLETED | OUTPATIENT
Start: 2021-10-29 | End: 2021-10-29

## 2021-10-29 RX ORDER — FENTANYL CITRATE 50 UG/ML
100 INJECTION, SOLUTION INTRAMUSCULAR; INTRAVENOUS ONCE
Status: COMPLETED | OUTPATIENT
Start: 2021-10-29 | End: 2021-10-29

## 2021-10-29 RX ORDER — ONDANSETRON 4 MG/1
4 TABLET, ORALLY DISINTEGRATING ORAL EVERY 8 HOURS PRN
Status: DISCONTINUED | OUTPATIENT
Start: 2021-10-29 | End: 2021-11-03 | Stop reason: HOSPADM

## 2021-10-29 RX ORDER — DEXAMETHASONE SODIUM PHOSPHATE 10 MG/ML
INJECTION, SOLUTION INTRAMUSCULAR; INTRAVENOUS PRN
Status: DISCONTINUED | OUTPATIENT
Start: 2021-10-29 | End: 2021-10-29 | Stop reason: SDUPTHER

## 2021-10-29 RX ORDER — LIDOCAINE HYDROCHLORIDE 10 MG/ML
INJECTION, SOLUTION INFILTRATION; PERINEURAL PRN
Status: DISCONTINUED | OUTPATIENT
Start: 2021-10-29 | End: 2021-10-29 | Stop reason: SDUPTHER

## 2021-10-29 RX ORDER — FAMOTIDINE 20 MG/1
20 TABLET, FILM COATED ORAL DAILY
Status: DISCONTINUED | OUTPATIENT
Start: 2021-10-29 | End: 2021-11-03 | Stop reason: HOSPADM

## 2021-10-29 RX ORDER — DIPHENHYDRAMINE HYDROCHLORIDE 50 MG/ML
12.5 INJECTION INTRAMUSCULAR; INTRAVENOUS
Status: DISCONTINUED | OUTPATIENT
Start: 2021-10-29 | End: 2021-10-29 | Stop reason: HOSPADM

## 2021-10-29 RX ORDER — DEXAMETHASONE SODIUM PHOSPHATE 4 MG/ML
INJECTION, SOLUTION INTRA-ARTICULAR; INTRALESIONAL; INTRAMUSCULAR; INTRAVENOUS; SOFT TISSUE PRN
Status: DISCONTINUED | OUTPATIENT
Start: 2021-10-29 | End: 2021-10-29 | Stop reason: SDUPTHER

## 2021-10-29 RX ORDER — OXYCODONE HYDROCHLORIDE 5 MG/1
5 TABLET ORAL EVERY 6 HOURS PRN
Status: DISCONTINUED | OUTPATIENT
Start: 2021-10-29 | End: 2021-11-03 | Stop reason: HOSPADM

## 2021-10-29 RX ORDER — ROPIVACAINE HYDROCHLORIDE 5 MG/ML
INJECTION, SOLUTION EPIDURAL; INFILTRATION; PERINEURAL PRN
Status: DISCONTINUED | OUTPATIENT
Start: 2021-10-29 | End: 2021-10-29 | Stop reason: SDUPTHER

## 2021-10-29 RX ORDER — MORPHINE SULFATE 2 MG/ML
2 INJECTION, SOLUTION INTRAMUSCULAR; INTRAVENOUS EVERY 4 HOURS PRN
Status: DISCONTINUED | OUTPATIENT
Start: 2021-10-29 | End: 2021-11-03 | Stop reason: HOSPADM

## 2021-10-29 RX ORDER — LIDOCAINE HYDROCHLORIDE 20 MG/ML
INJECTION, SOLUTION EPIDURAL; INFILTRATION; INTRACAUDAL; PERINEURAL PRN
Status: DISCONTINUED | OUTPATIENT
Start: 2021-10-29 | End: 2021-10-29 | Stop reason: SDUPTHER

## 2021-10-29 RX ORDER — SODIUM CHLORIDE 9 MG/ML
INJECTION, SOLUTION INTRAVENOUS CONTINUOUS PRN
Status: DISCONTINUED | OUTPATIENT
Start: 2021-10-29 | End: 2021-10-29 | Stop reason: SDUPTHER

## 2021-10-29 RX ORDER — GLYCOPYRROLATE 1 MG/5 ML
SYRINGE (ML) INTRAVENOUS PRN
Status: DISCONTINUED | OUTPATIENT
Start: 2021-10-29 | End: 2021-10-29 | Stop reason: SDUPTHER

## 2021-10-29 RX ORDER — NEOSTIGMINE METHYLSULFATE 1 MG/ML
INJECTION, SOLUTION INTRAVENOUS PRN
Status: DISCONTINUED | OUTPATIENT
Start: 2021-10-29 | End: 2021-10-29 | Stop reason: SDUPTHER

## 2021-10-29 RX ORDER — PROPOFOL 10 MG/ML
INJECTION, EMULSION INTRAVENOUS PRN
Status: DISCONTINUED | OUTPATIENT
Start: 2021-10-29 | End: 2021-10-29 | Stop reason: SDUPTHER

## 2021-10-29 RX ORDER — ONDANSETRON 2 MG/ML
INJECTION INTRAMUSCULAR; INTRAVENOUS PRN
Status: DISCONTINUED | OUTPATIENT
Start: 2021-10-29 | End: 2021-10-29 | Stop reason: SDUPTHER

## 2021-10-29 RX ORDER — SODIUM CHLORIDE 0.9 % (FLUSH) 0.9 %
5-40 SYRINGE (ML) INJECTION PRN
Status: DISCONTINUED | OUTPATIENT
Start: 2021-10-29 | End: 2021-11-03 | Stop reason: HOSPADM

## 2021-10-29 RX ORDER — ONDANSETRON 2 MG/ML
4 INJECTION INTRAMUSCULAR; INTRAVENOUS EVERY 6 HOURS PRN
Status: DISCONTINUED | OUTPATIENT
Start: 2021-10-29 | End: 2021-11-03 | Stop reason: HOSPADM

## 2021-10-29 RX ORDER — SODIUM CHLORIDE, SODIUM LACTATE, POTASSIUM CHLORIDE, CALCIUM CHLORIDE 600; 310; 30; 20 MG/100ML; MG/100ML; MG/100ML; MG/100ML
INJECTION, SOLUTION INTRAVENOUS CONTINUOUS
Status: DISCONTINUED | OUTPATIENT
Start: 2021-10-29 | End: 2021-11-03 | Stop reason: HOSPADM

## 2021-10-29 RX ORDER — SODIUM CHLORIDE 0.9 % (FLUSH) 0.9 %
5-40 SYRINGE (ML) INJECTION EVERY 12 HOURS SCHEDULED
Status: DISCONTINUED | OUTPATIENT
Start: 2021-10-29 | End: 2021-11-03 | Stop reason: HOSPADM

## 2021-10-29 RX ORDER — LIDOCAINE HYDROCHLORIDE 10 MG/ML
10 INJECTION, SOLUTION INFILTRATION; PERINEURAL
Status: COMPLETED | OUTPATIENT
Start: 2021-10-29 | End: 2021-10-29

## 2021-10-29 RX ORDER — ROCURONIUM BROMIDE 10 MG/ML
INJECTION, SOLUTION INTRAVENOUS PRN
Status: DISCONTINUED | OUTPATIENT
Start: 2021-10-29 | End: 2021-10-29 | Stop reason: SDUPTHER

## 2021-10-29 RX ORDER — FENTANYL CITRATE 50 UG/ML
INJECTION, SOLUTION INTRAMUSCULAR; INTRAVENOUS PRN
Status: DISCONTINUED | OUTPATIENT
Start: 2021-10-29 | End: 2021-10-29 | Stop reason: SDUPTHER

## 2021-10-29 RX ORDER — ROPIVACAINE HYDROCHLORIDE 5 MG/ML
30 INJECTION, SOLUTION EPIDURAL; INFILTRATION; PERINEURAL
Status: COMPLETED | OUTPATIENT
Start: 2021-10-29 | End: 2021-10-29

## 2021-10-29 RX ORDER — MEPERIDINE HYDROCHLORIDE 25 MG/ML
12.5 INJECTION INTRAMUSCULAR; INTRAVENOUS; SUBCUTANEOUS EVERY 5 MIN PRN
Status: DISCONTINUED | OUTPATIENT
Start: 2021-10-29 | End: 2021-10-29 | Stop reason: HOSPADM

## 2021-10-29 RX ORDER — SODIUM CHLORIDE 9 MG/ML
25 INJECTION, SOLUTION INTRAVENOUS PRN
Status: DISCONTINUED | OUTPATIENT
Start: 2021-10-29 | End: 2021-11-03 | Stop reason: HOSPADM

## 2021-10-29 RX ADMIN — ONDANSETRON 4 MG: 2 INJECTION INTRAMUSCULAR; INTRAVENOUS at 08:56

## 2021-10-29 RX ADMIN — Medication 0.2 MG: at 09:01

## 2021-10-29 RX ADMIN — LIDOCAINE HYDROCHLORIDE 5 ML: 10 INJECTION, SOLUTION INFILTRATION; PERINEURAL at 07:25

## 2021-10-29 RX ADMIN — FENTANYL CITRATE 50 MCG: 0.05 INJECTION, SOLUTION INTRAMUSCULAR; INTRAVENOUS at 07:22

## 2021-10-29 RX ADMIN — Medication 2 MG: at 09:01

## 2021-10-29 RX ADMIN — SODIUM CHLORIDE, POTASSIUM CHLORIDE, SODIUM LACTATE AND CALCIUM CHLORIDE: 600; 310; 30; 20 INJECTION, SOLUTION INTRAVENOUS at 11:02

## 2021-10-29 RX ADMIN — LIDOCAINE HYDROCHLORIDE 80 MG: 20 INJECTION, SOLUTION EPIDURAL; INFILTRATION; INTRACAUDAL; PERINEURAL at 07:41

## 2021-10-29 RX ADMIN — ROPIVACAINE HYDROCHLORIDE 30 ML: 5 INJECTION, SOLUTION EPIDURAL; INFILTRATION; PERINEURAL at 07:28

## 2021-10-29 RX ADMIN — DEXAMETHASONE SODIUM PHOSPHATE 10 MG: 4 INJECTION, SOLUTION INTRAMUSCULAR; INTRAVENOUS at 07:55

## 2021-10-29 RX ADMIN — PROPOFOL 130 MG: 10 INJECTION, EMULSION INTRAVENOUS at 07:41

## 2021-10-29 RX ADMIN — SODIUM CHLORIDE, POTASSIUM CHLORIDE, SODIUM LACTATE AND CALCIUM CHLORIDE: 600; 310; 30; 20 INJECTION, SOLUTION INTRAVENOUS at 21:26

## 2021-10-29 RX ADMIN — DEXAMETHASONE SODIUM PHOSPHATE 4 MG: 10 INJECTION INTRAMUSCULAR; INTRAVENOUS at 07:28

## 2021-10-29 RX ADMIN — DEXAMETHASONE SODIUM PHOSPHATE 4 MG: 10 INJECTION, SOLUTION INTRAMUSCULAR; INTRAVENOUS at 07:28

## 2021-10-29 RX ADMIN — LIDOCAINE HYDROCHLORIDE 1 ML: 10 INJECTION, SOLUTION INFILTRATION; PERINEURAL at 07:27

## 2021-10-29 RX ADMIN — ACETAMINOPHEN 500 MG: 500 TABLET ORAL at 21:21

## 2021-10-29 RX ADMIN — PHENYLEPHRINE HYDROCHLORIDE 100 MCG: 10 INJECTION INTRAVENOUS at 08:13

## 2021-10-29 RX ADMIN — RANOLAZINE 500 MG: 500 TABLET, FILM COATED, EXTENDED RELEASE ORAL at 21:21

## 2021-10-29 RX ADMIN — PHENYLEPHRINE HYDROCHLORIDE 100 MCG: 10 INJECTION INTRAVENOUS at 08:35

## 2021-10-29 RX ADMIN — SODIUM CHLORIDE, PRESERVATIVE FREE 10 ML: 5 INJECTION INTRAVENOUS at 21:21

## 2021-10-29 RX ADMIN — FENTANYL CITRATE 25 MCG: 50 INJECTION, SOLUTION INTRAMUSCULAR; INTRAVENOUS at 09:06

## 2021-10-29 RX ADMIN — PHENYLEPHRINE HYDROCHLORIDE 200 MCG: 10 INJECTION INTRAVENOUS at 07:56

## 2021-10-29 RX ADMIN — PHENYLEPHRINE HYDROCHLORIDE 200 MCG: 10 INJECTION INTRAVENOUS at 07:48

## 2021-10-29 RX ADMIN — Medication 2000 MG: at 16:36

## 2021-10-29 RX ADMIN — ROCURONIUM BROMIDE 30 MG: 10 INJECTION, SOLUTION INTRAVENOUS at 07:42

## 2021-10-29 RX ADMIN — SODIUM CHLORIDE: 9 INJECTION, SOLUTION INTRAVENOUS at 07:30

## 2021-10-29 RX ADMIN — PHENYLEPHRINE HYDROCHLORIDE 100 MCG: 10 INJECTION INTRAVENOUS at 08:02

## 2021-10-29 RX ADMIN — Medication 2000 MG: at 07:56

## 2021-10-29 RX ADMIN — FAMOTIDINE 20 MG: 10 INJECTION, SOLUTION INTRAVENOUS at 16:36

## 2021-10-29 ASSESSMENT — PULMONARY FUNCTION TESTS
PIF_VALUE: 23
PIF_VALUE: 23
PIF_VALUE: 2
PIF_VALUE: 23
PIF_VALUE: 23
PIF_VALUE: 20
PIF_VALUE: 23
PIF_VALUE: 22
PIF_VALUE: 24
PIF_VALUE: 11
PIF_VALUE: 19
PIF_VALUE: 22
PIF_VALUE: 2
PIF_VALUE: 2
PIF_VALUE: 1
PIF_VALUE: 22
PIF_VALUE: 23
PIF_VALUE: 11
PIF_VALUE: 23
PIF_VALUE: 2
PIF_VALUE: 11
PIF_VALUE: 22
PIF_VALUE: 23
PIF_VALUE: 11
PIF_VALUE: 12
PIF_VALUE: 23
PIF_VALUE: 23
PIF_VALUE: 5
PIF_VALUE: 10
PIF_VALUE: 15
PIF_VALUE: 19
PIF_VALUE: 2
PIF_VALUE: 23
PIF_VALUE: 23
PIF_VALUE: 2
PIF_VALUE: 0
PIF_VALUE: 2
PIF_VALUE: 22
PIF_VALUE: 23
PIF_VALUE: 5
PIF_VALUE: 18
PIF_VALUE: 23
PIF_VALUE: 11
PIF_VALUE: 20
PIF_VALUE: 23
PIF_VALUE: 1
PIF_VALUE: 19
PIF_VALUE: 11
PIF_VALUE: 2
PIF_VALUE: 23
PIF_VALUE: 2
PIF_VALUE: 23
PIF_VALUE: 2
PIF_VALUE: 1
PIF_VALUE: 23
PIF_VALUE: 20
PIF_VALUE: 23
PIF_VALUE: 17
PIF_VALUE: 10
PIF_VALUE: 1
PIF_VALUE: 5
PIF_VALUE: 20
PIF_VALUE: 22
PIF_VALUE: 23
PIF_VALUE: 23
PIF_VALUE: 20
PIF_VALUE: 20
PIF_VALUE: 11
PIF_VALUE: 19
PIF_VALUE: 20
PIF_VALUE: 23
PIF_VALUE: 2
PIF_VALUE: 19
PIF_VALUE: 20
PIF_VALUE: 13
PIF_VALUE: 23
PIF_VALUE: 19
PIF_VALUE: 7
PIF_VALUE: 23
PIF_VALUE: 23
PIF_VALUE: 20
PIF_VALUE: 23
PIF_VALUE: 5
PIF_VALUE: 2
PIF_VALUE: 1
PIF_VALUE: 1
PIF_VALUE: 2
PIF_VALUE: 5
PIF_VALUE: 23
PIF_VALUE: 1
PIF_VALUE: 10
PIF_VALUE: 19
PIF_VALUE: 19
PIF_VALUE: 11
PIF_VALUE: 2

## 2021-10-29 ASSESSMENT — PAIN SCALES - GENERAL
PAINLEVEL_OUTOF10: 5
PAINLEVEL_OUTOF10: 0
PAINLEVEL_OUTOF10: 5
PAINLEVEL_OUTOF10: 2
PAINLEVEL_OUTOF10: 5
PAINLEVEL_OUTOF10: 0

## 2021-10-29 ASSESSMENT — PAIN - FUNCTIONAL ASSESSMENT
PAIN_FUNCTIONAL_ASSESSMENT: ACTIVITIES ARE NOT PREVENTED
PAIN_FUNCTIONAL_ASSESSMENT: 0-10

## 2021-10-29 ASSESSMENT — PAIN DESCRIPTION - DESCRIPTORS: DESCRIPTORS: DISCOMFORT

## 2021-10-29 NOTE — PROGRESS NOTES
Occupational Therapy  OCCUPATIONAL THERAPY INITIAL EVALUATION     Annika Kirkpatrick [place], [place]        ZYSF:                                                  Patient Name: Melodie Brothers    MRN: [id number]    : 1940    Room: [address] [place] [address]      Evaluating OT: Angel Whitney, OTR/L [id number]      Referring Evangelina Thacker MD    Specific Provider Orders/Date: OT eval and treat 10/29/21      Diagnosis:  Knee pain [M25.569]  Gait instability [R26.81]  Closed displaced transverse fracture of left patella, initial encounter [S82.780A]     Surgery: L patella ORIF 10/29/21     Pertinent Medical History: a-fib, CAD, HTN, OA,       Precautions:  Fall Risk, LLE partial WB, L knee immobilizer, confused     Assessment of current deficits    [x] Functional mobility  [x]ADLs  [x] Strength               [x]Cognition    [x] Functional transfers   [x] IADLs         [x] Safety Awareness   [x]Endurance    [] Fine Coordination              [x] Balance      [] Vision/perception   []Sensation     []Gross Motor Coordination  [] ROM  [] Delirium                   [] Motor Control     OT PLAN OF CARE   OT POC based on physician orders, patient diagnosis and results of clinical assessment    Frequency/Duration 2-5 days/wk for 2 weeks PRN   Specific OT Treatment Interventions to include:   * Instruction/training on adapted ADL techniques and AE recommendations to increase functional independence within precautions       * Training on energy conservation strategies, correct breathing pattern and techniques to improve independence/tolerance for self-care routine  * Functional transfer/mobility training/DME recommendations for increased independence, safety, and fall prevention  * Patient/Family education to increase follow through with safety techniques and functional independence  * Recommendation of environmental modifications for increased safety with functional transfers/mobility and ADLs  * Cognitive retraining/development of therapeutic activities to improve problem solving, judgement, memory, and attention for increased safety/participation in ADL/IADL tasks  * Therapeutic exercise to improve motor endurance, ROM, and functional strength for ADLs/functional transfers  * Therapeutic activities to facilitate/challenge dynamic balance, stand tolerance for increased safety and independence with ADLs        Recommended Adaptive Equipment: Shower chair, wheeled walker    Home Living: Pt confused during session. Per daughter, pt lives alone in 1 story condo with 1 GRACY and 1 hand rail . Bathroom setup: tub/shower   Equipment owned: none    Prior Level of Function: Per daughter, pt independent with ADLs , assist from family with IADLs (medication management, transportation, meal prep); functional mobility: no device    Pain Level: Pt reported no pain this session  Cognition: A&O: Pt alert and oriented to self only; 1-2 step command follow demonstrated. Pt pleasant and agreeable to therapy, but confused throughout session   Memory:  poor   Sequencing:  poor   Problem solving:  poor   Judgement/safety:  poor     Functional Assessment:  AM-PAC Daily Activity Raw Score: 11/24   Initial Eval Status  Date: 10/29/21 Treatment Status  Date: STGs = LTGs  Time frame: 10-14 days   Feeding I     Grooming Mod A to d/t decreased standing tolerance  Min A   UB Dressing Mod A  SBA   LB Dressing Max A   Mod A-with use of AD as appropriate/needed   Bathing Mod A  Min A -with use of AD as appropriate/needed   Toileting Mod A  Min A    Bed Mobility  Supine to sit: Mod A   Sit to supine: Mod A  Assist with BLE   Min A to improve participation and safety with ADLs and functional tasks   Functional Transfers Mod A x2 with wheeled walker  Sit <>stand from EOB  Education provided on WB status and cues for hand placement.   Min A with adherence to WB status    Functional Mobility Mod A with wheeled walker for shuffle side steps toward [facility]. Cues for safe wheeled walker management and navigation. Min A -with device as needed to maximize independence with ADLs and functional task completion and adherence to WB status   Balance Sitting:     Static:  SBA    Dynamic:Mod A X2   Standing: Mod A with wheeled walker  Min A with standing balance to maximize independence with ADLs and functional task completion   Activity Tolerance Fair- with light activity  Fair with ADL activity. Visual/  Perceptual Glasses: yes                Additional long-term goal: Pt will increase functional independence to PLOF to allow pt to live in least restrictive environment. Hand Dominance R   AROM (PROM) Strength Additional Info:    [facility] WFL WFL  and FMC/dexterity noted during functional tasks       [facility] WFL  and FMC/dexterity noted during functional tasks     Hearing: WFL   Sensation:  No c/o numbness or tingling   Tone: WFL   Edema: none noted    Comments: Upon arrival patient sitting up in bed. At end of session, patient returned to bed with call light and phone within reach, all lines and tubes intact. Overall patient demonstrated decreased independence and safety during completion of ADL/functional transfer/mobility tasks. Pt would benefit from continued skilled OT to increase safety and independence with completion of ADL/IADL tasks for functional independence and quality of life. Rehab Potential: Good for established goals     Patient / Family Goal: none stated    Patient and/or family were instructed on functional diagnosis, prognosis/goals and OT plan of care. Demonstrated poor understanding.      Eval Complexity: Low    Time In: 1345  Time Out: 1410    Min Units   OT Eval Low 97165  x  1   OT Eval Medium 65598      OT Eval High [id number]      OT Re-Eval [id number]       Therapeutic Ex 37887       Therapeutic Activities 81511       ADL/Self Care 89945       Orthotic Management 36630       Manual 79935     Neuro Re-Ed 57684       Non-Billable Time          Evaluation Time additionally includes thorough review of current medical information, gathering information on past medical history/social history and prior level of function, interpretation of standardized testing/informal observation of tasks, assessment of data and development of plan of care and goals.             Morena Villegas, OTR/L, [id number]

## 2021-10-29 NOTE — DISCHARGE INSTR - COC
Continuity of Care Form    Patient Name: Carina Menchaca   :  1940  MRN:  [id number]    [facility] date:  10/27/2021  Discharge date:  11/3/21    Code Status Order: Full Code   Advance Directives:   [facility] Documentation     Date/Time Healthcare Directive Type of Healthcare Directive Copy in [address] Agent's Name Healthcare Agent's Phone Number    10/29/21 6531  Yes, patient has an advance directive for healthcare treatment  Durable power of  for health care  No, copy requested from family  Healthcare power of   --  --          Admitting Physician:  Bhavna Earl MD  PCP: Renee Yepez III, DO    Discharging Nurse: [facility] Unit/Room#: 0883/5624-D  Discharging Unit Phone Number: [phone]    Emergency Contact:   Extended Emergency Contact Information  Primary Emergency Contact: [place] [address] Phone: [phone]  Work Phone: [phone]  Relation: Child  Secondary Emergency Contact: H[place] [address] Phone: [phone]  Mobile Phone: [phone]  Relation: Child    Past Surgical History:  Past Surgical History:   Procedure Laterality Date    APPENDECTOMY      CARDIAC CATHETERIZATION  2017    Severe Calcific 3 vessel CAD. LV function size and systolic function. Elevated left ventricular end -diastolic pressures consistent with LV diastolic dysfunction.  PAROTIDECTOMY         Immunization History: There is no immunization history on file for this patient.     Active Problems:  Patient Active Problem List   Diagnosis Code    NSTEMI (non-ST elevated myocardial infarction) ([facility] [address] I21.4    Essential hypertension I10    Hyperlipidemia LDL goal <100 E78.5    Coronary artery disease involving native coronary artery of native heart without angina pectoris I25.10    Osteoarthritis of lumbar spine M47.816    Chronic left shoulder pain M25.512, G89.29    History of tobacco abuse Z87.891    Sinus bradycardia R00.1    H/O parotidectomy Z90.49    History of appendectomy Z90.49    Chronic midline low back pain M54.50, G89.29    Gastroesophageal reflux disease K21.9    Knee pain M25.569    Chronic atrial fibrillation (HCC) I48.20    Dementia without behavioral disturbance (HCC) F03.90    Displaced transverse fracture of left patella, initial encounter for closed fracture S82.032A       Isolation/Infection:   Isolation          No Isolation        Patient Infection Status     None to display          Nurse Assessment:  Last Vital Signs: /77   Pulse 83   Temp 96.8 °F (36 °C) (Temporal)   Resp 17   Ht 5' 2\" (1.575 m)   Wt 145 lb (65.8 kg)   SpO2 93%   BMI 26.52 kg/m²     Last documented pain score (0-10 scale): Pain Level: 0  Last Weight:   Wt Readings from Last 1 Encounters:   10/29/21 145 lb (65.8 kg)     Mental Status:  Alert,oriented to self, confused at times    IV Access:  - None    Nursing Mobility/ADLs:  Walking   Assisted  Transfer  Assisted  Bathing  Assisted  Dressing  Assisted  Toileting  Assisted  Feeding  Assisted- needs set up, able to feed self  Med Admin  Independent  Med Delivery   whole    Wound Care Documentation and Therapy:        Elimination:  Continence:   · Bowel:  Yes  · Bladder: incontinent at times  Urinary Catheter: None   Colostomy/Ileostomy/Ileal Conduit: No       Date of Last BM: 11/3/21    Intake/Output Summary (Last 24 hours) at 10/29/2021 1447  Last data filed at 10/29/2021 1021  Gross per 24 hour   Intake 900 ml   Output 505 ml   Net 395 ml     I/O last 3 completed shifts:  In: -   Out: 1200 [Urine:1200]    Safety Concerns:     Sundowners [place], History of Falls (last 30 days) and At Risk for Falls    Impairments/Disabilities:      Vision    Nutrition Therapy:  Current Nutrition Therapy:   - Oral Diet:  General    Routes of Feeding: Oral  Liquids: No Restrictions  Daily Fluid Restriction: no  Last Modified Barium Swallow with Video (Video Swallowing Test): not done    Treatments at the Time of Hospital Discharge:   Respiratory Treatments: none    Oxygen Therapy:  is not on home oxygen therapy. Ventilator:    - No ventilator support    Rehab Therapies: Physical Therapy and Occupational Therapy  Weight Bearing Status/Restrictions: 50% weight bearing with walker  Other Medical Equipment (for information only, NOT a DME order):  walker  Other Treatments: none      Patient's personal belongings (please select all that are sent with patient):  Glasses, clothing, personal items, splint/immobilizer on patient     RN SIGNATURE:  Electronically signed by Laurel Wasserman RN on 11/3/21 at 1:23 PM EDT    CASE MANAGEMENT/SOCIAL WORK SECTION    Inpatient Status Date:     Readmission Risk Assessment Score:  Readmission Risk              Risk of Unplanned Readmission:  0           Discharging to Facility/ Agency   · Name: [facility]  · [address] [place] [address], [facility] [employer] [facility]  · Phone:[phone]  · Fax:[phone]    Dialysis Facility (if applicable)   · Name:  · Address:  · Dialysis Schedule:  · Phone:  · Fax:    / signature: Electronically signed by COLTEN Cervantes on 10/29/21 at 2:48 PM EDT    PHYSICIAN SECTION    Prognosis: {Prognosis:5269238247}    Condition at Discharge: Stable    Rehab Potential (if transferring to Rehab): {Prognosis:1465761269}    Recommended Labs or Other Treatments After Discharge: ***    Physician Certification: I certify the above information and transfer of Edward Holm  is necessary for the continuing treatment of the diagnosis listed and that she requires [facility] for less 30 days.      Update Admission H&P: {P DME Changes in JERFS:[id number]}    PHYSICIAN SIGNATURE:  {Esignature:[id number]}

## 2021-10-29 NOTE — PROGRESS NOTES
[phone] to nurse report called to Carolin on [address] [facility], family up in pt room,

## 2021-10-29 NOTE — CARE COORDINATION
Social work / Discharge Planning:        Accepted by Gurpreet CABALLERO. Will need rapid covid for discharge. Awaiting therapy, may be click six. N-17, PASRR and ambulance form completed.    Electronically signed by COLTEN Nixon on 10/29/2021 at 2:46 PM

## 2021-10-29 NOTE — BRIEF OP NOTE
Brief Postoperative Note      Patient: Cornelius Rubi  YOB: 1940  MRN: [id number]    Date of Procedure: 10/29/2021    Pre-Op Diagnosis: Displaced left patella fracture    Post-Op Diagnosis: Same       Procedure(s):  OPEN REDUCTION INTERNAL FIXATION LEFT PATELLA    ++SYNTHES++    Surgeon(s):  Juliocesar Benedict MD    Assistant:  Physician Assistant: YVONNE Jean    Anesthesia: General    Estimated Blood Loss (mL): less than 50     Complications: None    Specimens:   * No specimens in log *    Implants:  Implant Name Type Inv.  Item Serial No.  Lot No. LRB No. Used Action   SCREW BNE L40MM [id number] S STL AKUA LNG HALF THRD SM HEX SOCK  SCREW BNE L40MM DIA4MM S STL [employer] LNG HALF THRD SM HEX SOCK  [id number]  Left 1 Implanted         Drains: * No LDAs found *    Findings: Displaced transverse fracture     Electronically signed by Juliocesar Benedict MD on 10/29/2021 at 9:14 AM

## 2021-10-29 NOTE — PROGRESS NOTES
[facility] Progress Note    Admitting Date and Time: 10/27/2021  7:49 PM  Admit Dx: Knee pain [M25.569]  Gait instability [R26.81]  Closed displaced transverse fracture of left patella, initial encounter [S82.032A]    Subjective:  Patient is being followed for Knee pain [M25.569]  Gait instability [R26.81]  Closed displaced transverse fracture of left patella, initial encounter [S82.032A]   Pt just returning from surgery. Very drowsy. Pain seems well controlled. Discussed patient's medical history. Her worsening dementia and further increasing needs at home. She is in need of rehab for her fracture and from there to assisted living. Also discussed end of life care. Patient does have a POA/living will. Patient will be DNR CCA as per recommendations from her children with POA. ROS: denies fever, chills, cp, sob, n/v, HA unless stated above.       [START ON 10/30/2021] rivaroxaban  20 mg Oral Daily with breakfast    sodium chloride flush  5-40 mL IntraVENous 2 times per day    ceFAZolin (ANCEF) IVPB  2,000 mg IntraVENous Q8H    famotidine  20 mg Oral Daily    Or    famotidine (PEPCID) injection  20 mg IntraVENous Daily    amLODIPine  5 mg Oral Daily    aspirin EC  81 mg Oral QAM    atorvastatin  40 mg Oral QAM    escitalopram  10 mg Oral Daily    isosorbide mononitrate  60 mg Oral Daily    metoprolol succinate  100 mg Oral Daily    ranolazine  500 mg Oral BID    valsartan  160 mg Oral Daily    And    hydroCHLOROthiazide  12.5 mg Oral Daily     sodium chloride flush, 5-40 mL, PRN  sodium chloride, 25 mL, PRN  ondansetron, 4 mg, Q8H PRN   Or  ondansetron, 4 mg, Q6H PRN  oxyCODONE, 5 mg, Q6H PRN  morphine, 2 mg, Q4H PRN  acetaminophen, 500 mg, Q6H PRN  traMADol, 50 mg, Daily PRN         Objective:    /77   Pulse 83   Temp 96.8 °F (36 °C) (Temporal)   Resp 17   Ht 5' 2\" (1.575 m)   Wt 145 lb (65.8 kg)   SpO2 93%   BMI 26.52 kg/m²     General Appearance: drowsy and

## 2021-10-29 NOTE — PROGRESS NOTES
Physical Therapy Initial Assessment     Name: Sharda Brewer  : 1940  MRN: [id number]      Date of Service: 10/29/2021    Evaluating PT:  Huey Becerril, PT, DPT [id number]      Room #:  4747/8167-P  Diagnosis:  Knee pain [M25.569]  Gait instability [R26.81]  Closed displaced transverse fracture of left patella, initial encounter [S82.112A]  PMHx/PSHx:  HTN, OA, Shoulder pain, hyperlipidemia, NSTEMI, CAD, A-fib, Appendectomy, Parotidectomy, Cardiac catheterization 2017  Procedure/Surgery:  10/29/2021 ORIF left patella  Precautions:  Falls, LLE in KI/Extension at all times, 50% WB LLE, Purewick catheter  Equipment Needs:  Wheeled Walker    SUBJECTIVE:    Pt lives alone in a 1 story home with 1 stairs to enter and 1 rail. Pt ambulated with no AD PTA. Pt's daughter reported family assisting with medicine management and transportation. Pt able to shower and dress self. OBJECTIVE:   Initial Evaluation  Date: 10/29/2021 Treatment Date:  NA Short Term/ Long Term   Goals   AM-PAC 6 Clicks 55/58     Was pt agreeable to Eval/treatment? Yes      Does pt have pain? No c/o pain     Bed Mobility  Rolling: Mod A  Supine to sit: Mod A  Sit to supine: Mod A  Scooting: Mod A  Rolling: Min A  Supine to sit: Min A  Sit to supine: Min A  Scooting: Min A   Transfers Sit to stand: Mod A +2  Stand to sit: Mod A +2  Stand pivot: NT  Sit to stand: Min A   Stand to sit: Min A  Stand pivot: Mod A   Ambulation    2 feet laterally with WW with Mod A  >10 feet with [employer] with Mod A   Stair negotiation: ascended and descended  NT  NA   ROM BUE:  WFL  BLE:  RLE WFL, LLE in KI     Strength BUE:  4/5  BLE:  RLE 4+/5, LLE NT  Increase strength 1/3 grade. Balance Sitting EOB:  SBA  Dynamic Standing: Mod A +2  Sitting EOB:  Supervision  Dynamic Standing: Mod A     Pt is A & O x 1, self only. Pt with dementia and pleasantly confused but able to follow commands.   Sensation:  Pt denies numbness and tingling to extremities  Edema:  None noted    Vitals:  Blood Pressure at rest - Blood Pressure post session -   Heart Rate at rest - Heart Rate post session -   SPO2 at rest - SPO2 post session -     Therapeutic Exercises:  NT    Patient education  Pt educated on purpose of PT assessment, importance of mobility, safety with mobility, transfers, gait, use of AD for safety    Patient response to education:   Pt verbalized understanding Pt demonstrated skill Pt requires further education in this area   Yes  Yes with verbal cues and assist Yes/Reinforcement     ASSESSMENT:    Conditions Requiring Skilled Therapeutic Intervention:     [x]Decreased strength     [x]Decreased ROM  [x]Decreased functional mobility  [x]Decreased balance   [x]Decreased endurance   []Decreased posture  []Decreased sensation  []Decreased coordination   []Decreased vision  [x]Decreased safety awareness   [x]Increased pain       Comments:  Patient cleared by RN and agreeable to treatment. Patient found in semi [place]'s with daughter present. Patient pleasantly confused but able to follow commands. Patient educated on WB restrictions prior to mobility. Patient's daughter provided home set up and history as patient not able. Patient required assist with LLE to the EOB and with trunk righting to achieve seated EOB. Patient denied dizziness with positional change. Patient again educated on WB restrictions, but not sure of comprehension so patient's foot placed on PT foot to monitor WB during standing and gait attempt. Patient not able to hop, but able to shuffle to the right toward the [facility]. Patient assisted back to seated EOB and then to supine with call light and tray table in reach. LLE elevated on a pillow and fresh ice pack placed on patellar area. Patient not able to comprehend exercises at this time.     Treatment:  Patient practiced and was instructed in the following treatment:    · Bed mobility: Verbal/tactile cues for sequencing BUEs/BLEs for safe technique with treatments: 2-5x/week x 1-2 weeks. Time in  1345  Time out  1415    Total Treatment Time 20 minutes     Evaluation Time includes thorough review of current medical information, gathering information on past medical history/social history and prior level of function, completion of standardized testing/informal observation of tasks, assessment of data and education on plan of care and goals.     CPT codes:  [] Low Complexity PT evaluation 71382  [x] Moderate Complexity PT evaluation 52622  [] High Complexity PT evaluation 83840  [] PT Re-evaluation 15339  [] Gait training 52597 - minutes  [] Manual therapy 19451 - minutes  [x] Therapeutic activities 51655 20 minutes  [] Therapeutic exercises 95922 - minutes  [] Neuromuscular reeducation 51892 - minutes     Monta Sever, PT, DPT  License [id number]

## 2021-10-29 NOTE — ANESTHESIA PROCEDURE NOTES
Peripheral Block    Patient location during procedure: pre-op  Staffing  Performed: anesthesiologist   Anesthesiologist: Maura Jaimes MD  Preanesthetic Checklist  Completed: patient identified, IV checked, site marked, risks and benefits discussed, surgical consent, monitors and equipment checked, pre-op evaluation, timeout performed, anesthesia consent given, oxygen available and patient being monitored  Peripheral Block  Patient position: supine  Prep: ChloraPrep  Patient monitoring: cardiac monitor, continuous pulse ox, frequent blood pressure checks and IV access  Block type: Femoral  Laterality: left  Injection technique: single-shot  Guidance: ultrasound guided  Local infiltration: ropivacaine and decadron  Adductor canal  Provider prep: mask and sterile gloves  Local infiltration: ropivacaine and decadron  Needle  Needle type: short-bevel   Needle gauge: 22 G  Needle length: 10 cm  Needle localization: ultrasound guidance  Assessment  Injection assessment: negative aspiration for heme, no paresthesia on injection and local visualized surrounding nerve on ultrasound  Paresthesia pain: none  Slow fractionated injection: yes  Hemodynamics: stable  Reason for block: post-op pain management

## 2021-10-29 NOTE — ANESTHESIA POSTPROCEDURE EVALUATION
Department of Anesthesiology  Postprocedure Note    Patient: Katheryn Litten  MRN: [id number]  YOB: 1940  Date of evaluation: 10/29/2021  Time:  6:26 PM     Procedure Summary     Date: 10/29/21 Room / Location: [address]    Anesthesia Start: 0730 Anesthesia Stop: 4572    Procedure: OPEN REDUCTION INTERNAL FIXATION LEFT PATELLA (Left Knee) Diagnosis: (-)    Surgeons: Wei Taylor MD Responsible Provider: Adrienne Cunningham MD    Anesthesia Type: general, regional ASA Status: 3          Anesthesia Type: general, regional    Jhon Phase I: Jhon Score: 8    Jhon Phase II:      Last vitals: Reviewed and per EMR flowsheets.        Anesthesia Post Evaluation    Patient location during evaluation: PACU  Patient participation: complete - patient participated  Level of consciousness: awake and alert  Pain score: 0  Airway patency: patent  Nausea & Vomiting: no vomiting and no nausea  Complications: no  Cardiovascular status: hemodynamically stable  Respiratory status: spontaneous ventilation  Hydration status: stable

## 2021-10-30 LAB
ANION GAP SERPL CALCULATED.3IONS-SCNC: 15 MMOL/L (ref 7–16)
BUN BLDV-MCNC: 14 MG/DL (ref 6–23)
CALCIUM SERPL-MCNC: 9.4 MG/DL (ref 8.6–10.2)
CHLORIDE BLD-SCNC: 100 MMOL/L (ref 98–107)
CO2: 18 MMOL/L (ref 22–29)
CREAT SERPL-MCNC: 0.6 MG/DL (ref 0.5–1)
GFR AFRICAN AMERICAN: >60
GFR NON-AFRICAN AMERICAN: >60 ML/MIN/1.73
GLUCOSE BLD-MCNC: 167 MG/DL (ref 74–99)
HCT VFR BLD CALC: 32.8 % (ref 34–48)
HEMOGLOBIN: 11.1 G/DL (ref 11.5–15.5)
MCH RBC QN AUTO: 30.6 PG (ref 26–35)
MCHC RBC AUTO-ENTMCNC: 33.8 % (ref 32–34.5)
MCV RBC AUTO: 90.4 FL (ref 80–99.9)
MRSA CULTURE ONLY: NORMAL
PDW BLD-RTO: 14.4 FL (ref 11.5–15)
PLATELET # BLD: 173 E9/L (ref 130–450)
PMV BLD AUTO: 12 FL (ref 7–12)
POTASSIUM SERPL-SCNC: 4.5 MMOL/L (ref 3.5–5)
RBC # BLD: 3.63 E12/L (ref 3.5–5.5)
SODIUM BLD-SCNC: 133 MMOL/L (ref 132–146)
WBC # BLD: 17.6 E9/L (ref 4.5–11.5)

## 2021-10-30 PROCEDURE — 85027 COMPLETE CBC AUTOMATED: CPT

## 2021-10-30 PROCEDURE — 80048 BASIC METABOLIC PNL TOTAL CA: CPT

## 2021-10-30 PROCEDURE — 97530 THERAPEUTIC ACTIVITIES: CPT

## 2021-10-30 PROCEDURE — 99225 PR SBSQ OBSERVATION CARE/DAY 25 MINUTES: CPT | Performed by: FAMILY MEDICINE

## 2021-10-30 PROCEDURE — G0378 HOSPITAL OBSERVATION PER HR: HCPCS

## 2021-10-30 PROCEDURE — 6360000002 HC RX W HCPCS: Performed by: ORTHOPAEDIC SURGERY

## 2021-10-30 PROCEDURE — 6370000000 HC RX 637 (ALT 250 FOR IP): Performed by: ORTHOPAEDIC SURGERY

## 2021-10-30 PROCEDURE — 36415 COLL VENOUS BLD VENIPUNCTURE: CPT

## 2021-10-30 PROCEDURE — 97116 GAIT TRAINING THERAPY: CPT

## 2021-10-30 PROCEDURE — 2580000003 HC RX 258: Performed by: ORTHOPAEDIC SURGERY

## 2021-10-30 RX ADMIN — ACETAMINOPHEN 500 MG: 500 TABLET ORAL at 10:09

## 2021-10-30 RX ADMIN — ESCITALOPRAM OXALATE 10 MG: 10 TABLET ORAL at 10:10

## 2021-10-30 RX ADMIN — ISOSORBIDE MONONITRATE 60 MG: 60 TABLET, EXTENDED RELEASE ORAL at 10:09

## 2021-10-30 RX ADMIN — VALSARTAN 160 MG: 160 TABLET, FILM COATED ORAL at 10:09

## 2021-10-30 RX ADMIN — RANOLAZINE 500 MG: 500 TABLET, FILM COATED, EXTENDED RELEASE ORAL at 22:25

## 2021-10-30 RX ADMIN — ACETAMINOPHEN 500 MG: 500 TABLET ORAL at 17:54

## 2021-10-30 RX ADMIN — ASPIRIN 81 MG: 81 TABLET, COATED ORAL at 10:10

## 2021-10-30 RX ADMIN — AMLODIPINE BESYLATE 5 MG: 5 TABLET ORAL at 10:09

## 2021-10-30 RX ADMIN — RIVAROXABAN 20 MG: 20 TABLET, FILM COATED ORAL at 17:54

## 2021-10-30 RX ADMIN — RANOLAZINE 500 MG: 500 TABLET, FILM COATED, EXTENDED RELEASE ORAL at 10:09

## 2021-10-30 RX ADMIN — SODIUM CHLORIDE, PRESERVATIVE FREE 10 ML: 5 INJECTION INTRAVENOUS at 10:11

## 2021-10-30 RX ADMIN — METOPROLOL SUCCINATE 100 MG: 100 TABLET, EXTENDED RELEASE ORAL at 10:09

## 2021-10-30 RX ADMIN — ATORVASTATIN CALCIUM 40 MG: 40 TABLET, FILM COATED ORAL at 10:09

## 2021-10-30 RX ADMIN — Medication 2000 MG: at 00:52

## 2021-10-30 RX ADMIN — FAMOTIDINE 20 MG: 20 TABLET, FILM COATED ORAL at 10:10

## 2021-10-30 RX ADMIN — HYDROCHLOROTHIAZIDE 12.5 MG: 12.5 TABLET ORAL at 10:09

## 2021-10-30 ASSESSMENT — PAIN - FUNCTIONAL ASSESSMENT: PAIN_FUNCTIONAL_ASSESSMENT: ACTIVITIES ARE NOT PREVENTED

## 2021-10-30 ASSESSMENT — PAIN SCALES - GENERAL
PAINLEVEL_OUTOF10: 4
PAINLEVEL_OUTOF10: 0
PAINLEVEL_OUTOF10: 0
PAINLEVEL_OUTOF10: 6

## 2021-10-30 ASSESSMENT — PAIN SCALES - WONG BAKER: WONGBAKER_NUMERICALRESPONSE: 0

## 2021-10-30 ASSESSMENT — PAIN DESCRIPTION - PROGRESSION: CLINICAL_PROGRESSION: RESOLVED

## 2021-10-30 NOTE — PROGRESS NOTES
Daily Treat  Evaluating PT:  Stephanie Joseph, PT, DPT [id number]        Room #:  9404/5542-W  Diagnosis:  Knee pain [M25.569]  Gait instability [R26.81]  Closed displaced transverse fracture of left patella, initial encounter [S82.032A]  PMHx/PSHx:  HTN, OA, Shoulder pain, hyperlipidemia, NSTEMI, CAD, A-fib, Appendectomy, Parotidectomy, Cardiac catheterization 11/2017  Procedure/Surgery:  10/29/2021 ORIF left patella  Precautions:  Falls, LLE in KI/Extension at all times, 50% WB LLE, Purewick catheter  Equipment Needs:  Wheeled Walker     SUBJECTIVE:     Pt lives alone in a 1 story home with 1 stairs to enter and 1 rail. Pt ambulated with no AD PTA. Pt's daughter reported family assisting with medicine management and transportation. Pt able to shower and dress self.     OBJECTIVE:    Initial Evaluation  Date: 10/29/2021 Treatment Date:  10/30/21 Short Term/ Long Term   Goals   AM-PAC 6 Clicks 29/39 23/33      Was pt agreeable to Eval/treatment? Yes  Yes very talkative      Does pt have pain? No c/o pain No c/o pain      Bed Mobility  Rolling: Mod A  Supine to sit: Mod A  Sit to supine: Mod A  Scooting: Mod A Mod assist all levels 2/2 confusion, limited focusing/talking whole time   Rolling: Min A  Supine to sit: Min A  Sit to supine: Min A  Scooting: Min A   Transfers Sit to stand: Mod A +2  Stand to sit: Mod A +2  Stand pivot: NT Min x 2 PWB/immob in line   Sit to stand: Min A   Stand to sit: Min A  Stand pivot: Mod A   Ambulation    2 feet laterally with WW with Mod A Bed to chair X-thao 2/2 inability to maintain PWB status  >10 feet with WW with Mod A   Stair negotiation: ascended and descended  NT NA  NA   ROM BUE:  WFL  BLE:  RLE WFL, LLE in KI  Braced/immob in line, WNL ankle     Strength BUE:  4/5  BLE:  RLE 4+/5, LLE NT  NA braced, ankle 5/5 Increase strength 1/3 grade. Balance Sitting EOB:  SBA  Dynamic Standing:   Mod A +2  Indep EOB, stand [employer] and min A x 2-1  Sitting EOB:  Supervision   Pt is alert and oriented x 2. Dtr in room assisting with q&A. Balance: Sit indep and stand requiring [employer] and Min A x 2-1 PWB and immob in line     Patient education  Pt educated on PWB status,     Patient response to education:   Pt verbalized understanding Pt demonstrated skill Pt requires further education in this area   Y No-Yes Review daily     ASSESSMENT:    Comments: In bed on arrival with Dtr in rom assisting with Q&A. Very pleasant and cooperative; talking whole time through Tx w/o resting conversation. Limited focusing on task at hand 2/2 wanting to talk. No pain or expressions of pain surfaces during assisted transfer to EOB Mod to max assist for that. No trunk listing during sitting EOB. No dizziness reported throughout. Transfer to stand with LT Knee immob in line and secured. Min assist to stand with ability to maintain PWB for transfer but unable to maintain during mobility. Mobility aborted 2/2 inability to maintain PWB status. Safe transfer to hip chair. Waffle cushion and chair alarm in line and ON. Table used for footrest to improve LE elevation during sitting. Pillow as topper on table for comfort. Call light and wall phone to LT on bed. Dtr remains in room to assist care. Both very appreciative for care. Treatment:  Patient practiced and was instructed in the following treatment:     Bed mob   Transfers    Education       PLAN:    Pt is making Fair progress toward established Physical Therapy goals. Continue with physical therapy current plan of care. Total Treatment Time  20 minutes     Evaluation Time includes thorough review of current medical information, gathering information on past medical history/social history and prior level of function, completion of standardized testing/informal observation of tasks, assessment of data and education on plan of care and goals.     CPT codes:  [] Low Complexity PT evaluation 71687  [] Moderate Complexity PT evaluation 77562  [] High Complexity PT evaluation [id number]  [] PT Re-evaluation [id number]  [x] Gait training 78898 10 minutes  [] Manual therapy 74265 ** minutes  [x] Therapeutic activities 42947 10 minutes  [] Therapeutic exercises 31016 ** minutes  [] Neuromuscular reeducation 70133 ** minutes       Johnny Ortiz PT

## 2021-10-30 NOTE — PROGRESS NOTES
Spoke with Dr. Luiz Riojas regarding fall and no new orders received. Pt continued to deny any discomfort. Appears to be resting with eyes closed. Bed alarm on and call light within reach.

## 2021-10-30 NOTE — PROGRESS NOTES
Staff overheard pt yell \"iris\". Nurse staff approached room and observed pt laying flat on back next to bed with all extremities aligned with body. Immobilizer intact to left leg. Vitals read  T 98.6, , /79, RR 18, O2 96% on RA. Pt denied pain but did appear to be anxious. Non bleeding dave size or less skin tear noted to right knee. Pt unsure if she hit her head, nurse perfomed head assessment and did not note any lumps, bruising, or visible bleeding to head When asked reason for attempting to get out of bed with assistance she replied \"I have to get home now\". Pt appears confused and which is her baseline. Nurse staff performed ROM to BUE and right leg and pt denied pain and did not appear to be in any obvious distress. . All staff then assisted pt back into bed, pt did not bear any weight to BLE. Nurse notified on call Orthopedic surgeon Dr. Mitul Baltazar and spoke to answering service ([phone]) and requested a call back. Notified Dr. Jag Caba and did not receive any new orders. Pt hasn't restarted any blood thinners since surgery. No CT warranted per Dr. Jag Caba. Nurse will monitor for any change in condition.

## 2021-10-30 NOTE — PROGRESS NOTES
ORIF left patella    Post op Day 1      S: Pain controlled. Tolerating diet. Up to chair. No issues overnight.     O:     Vitals:    10/30/21 0955   BP: 113/84   Pulse: 115   Resp: 16   Temp: 97.5 °F (36.4 °C)   SpO2: 94%      Baseline dementia   Dressing c/d/i   NV exam - stable   Calf - soft/nontender     H/H:   Recent Labs     10/30/21  0118   HGB 11.1*   HCT 32.8*        PT/INR:   Recent Labs     10/29/21  0548   INR 1.3       A/P:   Stable   Start PT/OT -50% weightbearing left lower extremity with brace   Dressing change postoperative day #7             Stay on Xarelto long-term   D/C planning-likely subacute rehab   Follow-up office 1 to 2 weeks for x-rays and new brace

## 2021-10-30 NOTE — PROGRESS NOTES
[facility] Progress Note    Admitting Date and Time: 10/27/2021  7:49 PM  Admit Dx: Knee pain [M25.569]  Gait instability [R26.81]  Closed displaced transverse fracture of left patella, initial encounter [S82.032A]    Subjective:  Patient is being followed for Knee pain [M25.569]  Gait instability [R26.81]  Closed displaced transverse fracture of left patella, initial encounter [S82.032A]   Pt feels well today despite a fall yesterday evening. She has a bruise on her right upper arm, but otherwise feeling well. She did not hit her head. She is having mild pain in the left knee that is responding to tylenol. She is pleasantly demented. ROS: denies fever, chills, cp, sob, n/v, HA unless stated above.       rivaroxaban  20 mg Oral Daily with breakfast    sodium chloride flush  5-40 mL IntraVENous 2 times per day    famotidine  20 mg Oral Daily    Or    famotidine (PEPCID) injection  20 mg IntraVENous Daily    amLODIPine  5 mg Oral Daily    aspirin EC  81 mg Oral QAM    atorvastatin  40 mg Oral QAM    escitalopram  10 mg Oral Daily    isosorbide mononitrate  60 mg Oral Daily    metoprolol succinate  100 mg Oral Daily    ranolazine  500 mg Oral BID    valsartan  160 mg Oral Daily    And    hydroCHLOROthiazide  12.5 mg Oral Daily     sodium chloride flush, 5-40 mL, PRN  sodium chloride, 25 mL, PRN  ondansetron, 4 mg, Q8H PRN   Or  ondansetron, 4 mg, Q6H PRN  oxyCODONE, 5 mg, Q6H PRN  morphine, 2 mg, Q4H PRN  acetaminophen, 500 mg, Q6H PRN  traMADol, 50 mg, Daily PRN         Objective:    BP (!) 119/92   Pulse 97   Temp 96.6 °F (35.9 °C) (Temporal)   Resp 14   Ht 5' 2\" (1.575 m)   Wt 145 lb (65.8 kg)   SpO2 92%   BMI 26.52 kg/m²     General Appearance: drowsy and in no acute distress  Skin: warm and dry  Head: normocephalic and atraumatic  Neck: neck supple and non tender without mass   Pulmonary/Chest: clear to auscultation bilaterally- no wheezes, rales or rhonchi, normal air movement, no respiratory distress  Cardiovascular: normal rate, normal S1 and S2 and no carotid bruits  Abdomen: soft, non-tender, non-distended, normal bowel sounds, no masses or organomegaly  Extremities: no cyanosis, no clubbing and no edema; knee immobilizer on the left          Recent Labs     10/27/21  2252 10/29/21  0548 10/30/21  0118    136 133   K 4.5 3.8 4.5    99 100   CO2 23 23 18*   BUN 25* 16 14   CREATININE 0.8 0.7 0.6   GLUCOSE 132* 104* 167*   CALCIUM 10.6* 10.5* 9.4       Recent Labs     10/27/21  2252 10/29/21  0548 10/30/21  0118   WBC 9.2 14.0* 17.6*   RBC 4.27 4.13 3.63   HGB 12.8 12.3 11.1*   HCT 38.8 37.0 32.8*   MCV 90.9 89.6 90.4   MCH 30.0 29.8 30.6   MCHC 33.0 33.2 33.8   RDW 14.6 14.4 14.4    184 173   MPV 11.3 12.4* 12.0           Assessment:    Principal Problem:    Displaced transverse fracture of left patella, initial encounter for closed fracture  Active Problems:    Essential hypertension    Coronary artery disease involving native coronary artery of native heart without angina pectoris    Knee pain    Chronic atrial fibrillation (HCC)    Dementia without behavioral disturbance (HCC)  Resolved Problems:    * No resolved hospital problems. *      Plan:  1. S/P ORIF left patella-care per ortho; will need rehab; likely discharge Monday  2. Atrial fibrillation-continue metoprolol. Resume xarelto  3. Dementia-continue rivastigmine  4. CAD-continue aspirin and statin    Code status-DNR CCA      NOTE: This report was transcribed using voice recognition software. Every effort was made to ensure accuracy; however, inadvertent computerized transcription errors may be present.   Electronically signed by Bhargavi Parnell MD on 10/30/2021 at 2:08 PM

## 2021-10-30 NOTE — OP NOTE
[address] [address]                                OPERATIVE REPORT    PATIENT NAME: Jason Archibald                    :        1940  MED REC NO:   [id number]                            ROOM:       3760  ACCOUNT NO:   [de-identified]                           ADMIT DATE: 10/27/2021  PROVIDER:     Drew Boss MD    DATE OF PROCEDURE:  10/29/2021    PREOPERATIVE DIAGNOSIS:  Displaced transverse left patella fracture. POSTOPERATIVE DIAGNOSIS:  Displaced transverse left patella fracture. PROCEDURE PERFORMED:  1. Open reduction with internal fixation, left patella. 2.  Use of C-arm control. SURGEON:  Drew Boss MD.    ASSISTANT:  Aditi Patel PA-C. No qualified surgical resident  available. ANESTHESIA:  General.    ESTIMATED BLOOD LOSS:  Less than 50. COMPLICATIONS:  None. CONDITION:  Stable to Recovery. INDICATIONS:  The patient is an 23-year-old woman admitted after a slip  and fall landing onto her left knee. Radiographs consistent with  displaced transverse patella fracture with loss of extensor mechanism  continuity. Operative fixation was recommended following explanation of  the risks, benefits, alternatives, and limitations. Informed consent  obtained. DESCRIPTION OF PROCEDURE:  The patient was met in the preop holding  area. The left knee was marked as the correct operative site. Adductor  canal block was performed by the Anesthesia Department, taken to the  operating room, where general anesthesia was administered. Intravenous  antibiotics were given as prophylaxis. Tourniquet placed in the  proximal left thigh. Left lower extremity prepped and draped in the  usual sterile fashion. Following surgical time-out, the limb was  elevated and exsanguinated with an Esmarch. Tourniquet inflated to 250  mmHg. I accessed the left knee through a midline incision.   Careful  soft tissue handling was undertaken raising full-thickness medial and  lateral flaps. There was a small amount of subcutaneous hematoma. The  transverse fracture was then exposed and all interposed soft tissue and  loose debris were evacuated with suction. I then applied a two-point  reduction clamp over the proximal and distal poles to reduce the  fracture. I brought in C-arm fluoroscopy to confirm an adequate  reduction on biplanar imaging. I then placed two parallel threaded  K-wires from a distal to proximal direction for cannulated screw  fixation. I confirmed that the wires were through the central aspect of  the bone. The depth gauge was applied and two 4.0 partially threaded  cancellous screws from Perfect measuring 40 mm were selected for implantation. Both  were inserted with good bony purchase. An 18-gauge wire was then  selected for the tension band. This was then passed from a distal to  proximal direction through the lateral screw first, crossing over to the  medial side, entering the head of the medial screw, and exiting the  threaded end of the medial screw. The tension band was then folded over  into a butterfly type configuration. The wires were tightened  appropriately producing an excellent reduction of the fracture. I then  brought in C-arm fluoroscopy to confirm adequate placement of the screws  as well as a reduced articular surface. Final tightening was then  undertaken and the wires were trimmed. The end was buried in the soft  tissue to avoid irritation. The wound was copiously lavaged with normal  saline. The small longitudinal slits in the extensor mechanism were  closed with interrupted #1 Vicryl. The dermal layer was then closed  with 2-0 Vicryl followed by 3-0 Monocryl subcuticular suture for skin. An absorbent waterproof dressing was applied over the knee followed by  an Ace bandage and knee immobilizer. Tourniquet deflated during wound  closure.   The patient tolerated the procedure well without  intraoperative complications. At the conclusion of the case, all sponge  and needle counts were correct. She was transferred from the operating  table onto her hospital bed, awakened and extubated, transported to the  recovery room in stable condition. Of note, physician's assistant was present throughout the entirety of  the case. His presence was necessary to aid with patient positioning,  draping, limb positioning, wound closure, application of surgical  dressing, and patient transport from the operating room table. No  qualified surgical resident available.         Iris Croft MD    D: 10/29/2021 9:23:48       T: 10/29/2021 9:27:11     [email or web address]  Job#: [id number]     Doc#: [id number]    CC:

## 2021-10-30 NOTE — PROGRESS NOTES
No call back from Dr. Radha Cui. Nurse called answering service at [phone] to page him. Nurse requested a urgent call back.

## 2021-10-31 LAB
ANION GAP SERPL CALCULATED.3IONS-SCNC: 12 MMOL/L (ref 7–16)
BACTERIA: ABNORMAL /HPF
BILIRUBIN URINE: NEGATIVE
BLOOD, URINE: NEGATIVE
BUN BLDV-MCNC: 27 MG/DL (ref 6–23)
CALCIUM SERPL-MCNC: 9.5 MG/DL (ref 8.6–10.2)
CHLORIDE BLD-SCNC: 100 MMOL/L (ref 98–107)
CLARITY: CLEAR
CO2: 23 MMOL/L (ref 22–29)
COLOR: YELLOW
CREAT SERPL-MCNC: 0.7 MG/DL (ref 0.5–1)
EPITHELIAL CELLS, UA: ABNORMAL /HPF
GFR AFRICAN AMERICAN: >60
GFR NON-AFRICAN AMERICAN: >60 ML/MIN/1.73
GLUCOSE BLD-MCNC: 114 MG/DL (ref 74–99)
GLUCOSE URINE: NEGATIVE MG/DL
HCT VFR BLD CALC: 32.1 % (ref 34–48)
HEMOGLOBIN: 10.8 G/DL (ref 11.5–15.5)
KETONES, URINE: NEGATIVE MG/DL
LEUKOCYTE ESTERASE, URINE: ABNORMAL
MCH RBC QN AUTO: 29.9 PG (ref 26–35)
MCHC RBC AUTO-ENTMCNC: 33.6 % (ref 32–34.5)
MCV RBC AUTO: 88.9 FL (ref 80–99.9)
NITRITE, URINE: NEGATIVE
PDW BLD-RTO: 14.6 FL (ref 11.5–15)
PH UA: 6 (ref 5–9)
PLATELET # BLD: 176 E9/L (ref 130–450)
PMV BLD AUTO: 11.6 FL (ref 7–12)
POTASSIUM SERPL-SCNC: 4.2 MMOL/L (ref 3.5–5)
PROTEIN UA: NEGATIVE MG/DL
RBC # BLD: 3.61 E12/L (ref 3.5–5.5)
RBC UA: ABNORMAL /HPF (ref 0–2)
SODIUM BLD-SCNC: 135 MMOL/L (ref 132–146)
SPECIFIC GRAVITY UA: <=1.005 (ref 1–1.03)
UROBILINOGEN, URINE: 0.2 E.U./DL
WBC # BLD: 19.5 E9/L (ref 4.5–11.5)
WBC UA: ABNORMAL /HPF (ref 0–5)

## 2021-10-31 PROCEDURE — 81001 URINALYSIS AUTO W/SCOPE: CPT

## 2021-10-31 PROCEDURE — 6370000000 HC RX 637 (ALT 250 FOR IP): Performed by: ORTHOPAEDIC SURGERY

## 2021-10-31 PROCEDURE — 99225 PR SBSQ OBSERVATION CARE/DAY 25 MINUTES: CPT | Performed by: FAMILY MEDICINE

## 2021-10-31 PROCEDURE — G0378 HOSPITAL OBSERVATION PER HR: HCPCS

## 2021-10-31 PROCEDURE — 97116 GAIT TRAINING THERAPY: CPT

## 2021-10-31 PROCEDURE — 85027 COMPLETE CBC AUTOMATED: CPT

## 2021-10-31 PROCEDURE — 80048 BASIC METABOLIC PNL TOTAL CA: CPT

## 2021-10-31 PROCEDURE — 36415 COLL VENOUS BLD VENIPUNCTURE: CPT

## 2021-10-31 PROCEDURE — 87088 URINE BACTERIA CULTURE: CPT

## 2021-10-31 RX ADMIN — FAMOTIDINE 20 MG: 20 TABLET, FILM COATED ORAL at 10:32

## 2021-10-31 RX ADMIN — ACETAMINOPHEN 500 MG: 500 TABLET ORAL at 14:11

## 2021-10-31 RX ADMIN — METOPROLOL SUCCINATE 100 MG: 100 TABLET, EXTENDED RELEASE ORAL at 10:32

## 2021-10-31 RX ADMIN — AMLODIPINE BESYLATE 5 MG: 5 TABLET ORAL at 10:32

## 2021-10-31 RX ADMIN — ATORVASTATIN CALCIUM 40 MG: 40 TABLET, FILM COATED ORAL at 10:31

## 2021-10-31 RX ADMIN — ASPIRIN 81 MG: 81 TABLET, COATED ORAL at 10:32

## 2021-10-31 RX ADMIN — ACETAMINOPHEN 500 MG: 500 TABLET ORAL at 23:13

## 2021-10-31 RX ADMIN — RIVAROXABAN 20 MG: 20 TABLET, FILM COATED ORAL at 17:45

## 2021-10-31 RX ADMIN — HYDROCHLOROTHIAZIDE 12.5 MG: 12.5 TABLET ORAL at 10:31

## 2021-10-31 RX ADMIN — RANOLAZINE 500 MG: 500 TABLET, FILM COATED, EXTENDED RELEASE ORAL at 10:31

## 2021-10-31 RX ADMIN — RANOLAZINE 500 MG: 500 TABLET, FILM COATED, EXTENDED RELEASE ORAL at 23:14

## 2021-10-31 RX ADMIN — VALSARTAN 160 MG: 160 TABLET, FILM COATED ORAL at 10:32

## 2021-10-31 RX ADMIN — ESCITALOPRAM OXALATE 10 MG: 10 TABLET ORAL at 10:32

## 2021-10-31 RX ADMIN — ISOSORBIDE MONONITRATE 60 MG: 60 TABLET, EXTENDED RELEASE ORAL at 10:31

## 2021-10-31 ASSESSMENT — PAIN DESCRIPTION - PROGRESSION: CLINICAL_PROGRESSION: RAPIDLY IMPROVING

## 2021-10-31 ASSESSMENT — PAIN SCALES - PAIN ASSESSMENT IN ADVANCED DEMENTIA (PAINAD)
FACIALEXPRESSION: 0
BODYLANGUAGE: 0
TOTALSCORE: 0
NEGVOCALIZATION: 0
CONSOLABILITY: 0
BREATHING: 0

## 2021-10-31 ASSESSMENT — PAIN - FUNCTIONAL ASSESSMENT: PAIN_FUNCTIONAL_ASSESSMENT: ACTIVITIES ARE NOT PREVENTED

## 2021-10-31 ASSESSMENT — PAIN SCALES - GENERAL
PAINLEVEL_OUTOF10: 0
PAINLEVEL_OUTOF10: 5
PAINLEVEL_OUTOF10: 5
PAINLEVEL_OUTOF10: 0

## 2021-10-31 NOTE — PROGRESS NOTES
Daily Treat  Evaluating PT:  Jacksonlesly Sahu, PT, DPT [id number]        Room #:  8685/2700-K  Diagnosis:  Knee pain [M25.569]  Gait instability [R26.81]  Closed displaced transverse fracture of left patella, initial encounter [S82.032A]  PMHx/PSHx:  HTN, OA, Shoulder pain, hyperlipidemia, NSTEMI, CAD, A-fib, Appendectomy, Parotidectomy, Cardiac catheterization 11/2017  Procedure/Surgery:  10/29/2021 ORIF left patella  Precautions:  Falls, LLE in KI/Extension at all times, 50% WB LLE, Purewick catheter  Equipment Needs:  Wheeled Walker     SUBJECTIVE:     Pt lives alone in a 1 story home with 1 stairs to enter and 1 rail. Pt ambulated with no AD PTA. Pt's daughter reported family assisting with medicine management and transportation. Pt able to shower and dress self.     OBJECTIVE:    Initial Evaluation  Date: 10/29/2021 Treatment Date:  10/31/21 Short Term/ Long Term   Goals   AM-PAC 6 Clicks 01/06 33/59      Was pt agreeable to Eval/treatment? Yes  Yes       Does pt have pain? No c/o pain No c/o pain      Bed Mobility  Rolling: Mod A  Supine to sit: Mod A  Sit to supine: Mod A  Scooting: Mod A Mod assist all levels 2/2 confusion, limited focusing/talking whole time   Rolling: Min A  Supine to sit: Min A  Sit to supine: Min A  Scooting: Min A   Transfers Sit to stand: Mod A +2  Stand to sit: Mod A +2  Stand pivot: NT Min x 1 PWB/immob in line   Sit to stand: Min A   Stand to sit: Min A  Stand pivot: Mod A   Ambulation    2 feet laterally with WW with Mod A  Aborted 2/2 inability to maintain PWB  >10 feet with WW with Mod A   Stair negotiation: ascended and descended  NT NA  NA   ROM BUE:  WFL  BLE:  RLE WFL, LLE in KI  Braced/immob in line, WNL ankle/toes     Strength BUE:  4/5  BLE:  RLE 4+/5, LLE NT  NA braced, ankle 5/5 Increase strength 1/3 grade. Balance Sitting EOB:  SBA  Dynamic Standing: Mod A +2  Indep EOB, stand [address] and min A x 1  Sitting EOB:  Supervision     Pt is alert and oriented to person.     Balance: Sit indep and stand requiring [facility] and Min/CGA A x 1 PWB and immob in line     Patient education: High confusion  Pt educated on PWB status, transfers from chair, How to scoot back in chair, needs verbal cues for all general mobility tasks today    Patient response to education:   Pt verbalized understanding Pt demonstrated skill Pt requires further education in this area   Y No-Yes Review daily     ASSESSMENT:    Comments 10/31/21: In no distress on arrival.  Highly alert but disoriented and confused. Oriented to name only. Very pleasant and cooperative and talkative. LT knee immob in line and secured; no distal c/o numbness. Needs both verbal and manual cues for basic tasks such as scoot back in chair. Transfer to stand with Min assist x 1 today with ability to transfer 50% LTLE. Attempted mobility with marked confusion on how to mobilize PWB status. Full loading on her LTLE during locomotion and therefore aborted. There-ex in hip chair x 20 reps ankle and glut/hip. Legs elevated with round table with pillow topper for comfort. Sitter in room. Chair alarm in line and activated. Comments 10/31/21: In bed on arrival with Dtr in rom assisting with Q&A. Very pleasant and cooperative; talking whole time through Tx w/o resting conversation. Limited focusing on task at hand 2/2 wanting to talk. No pain or expressions of pain surfaces during assisted transfer to EOB Mod to max assist for that. No trunk listing during sitting EOB. No dizziness reported throughout. Transfer to stand with LT Knee immob in line and secured. Min assist to stand with ability to maintain PWB for transfer but unable to maintain during mobility. Mobility aborted 2/2 inability to maintain PWB status. Safe transfer to hip chair. Waffle cushion and chair alarm in line and ON. Table used for footrest to improve LE elevation during sitting. Pillow as topper on table for comfort. Call light and wall phone to LT on bed.   Dtr remains in room to assist care. Both very appreciative for care. Treatment:  Patient practiced and was instructed in the following treatment:     Transfers   Education   Attempt Amb.  There-ex    PLAN:    Pt is making Fair progress toward established Physical Therapy goals. Continue with physical therapy current plan of care. Total Treatment Time  20 minutes     Evaluation Time includes thorough review of current medical information, gathering information on past medical history/social history and prior level of function, completion of standardized testing/informal observation of tasks, assessment of data and education on plan of care and goals.     CPT codes:  [] Low Complexity PT evaluation 46192  [] Moderate Complexity PT evaluation 56870  [] High Complexity PT evaluation 69622  [] PT Re-evaluation 98759  [x] Gait training 96357 10 minutes  [] Manual therapy 66609 ** minutes  [] Therapeutic activities 33649  minutes  [x] Therapeutic exercises 69845 10 minutes  [] Neuromuscular reeducation 51567 ** minutes       Abbey Ann PT

## 2021-11-01 LAB
ANION GAP SERPL CALCULATED.3IONS-SCNC: 11 MMOL/L (ref 7–16)
BUN BLDV-MCNC: 19 MG/DL (ref 6–23)
CALCIUM SERPL-MCNC: 9.7 MG/DL (ref 8.6–10.2)
CHLORIDE BLD-SCNC: 99 MMOL/L (ref 98–107)
CO2: 23 MMOL/L (ref 22–29)
CREAT SERPL-MCNC: 0.8 MG/DL (ref 0.5–1)
GFR AFRICAN AMERICAN: >60
GFR NON-AFRICAN AMERICAN: >60 ML/MIN/1.73
GLUCOSE BLD-MCNC: 122 MG/DL (ref 74–99)
HCT VFR BLD CALC: 32.1 % (ref 34–48)
HEMOGLOBIN: 10.8 G/DL (ref 11.5–15.5)
MCH RBC QN AUTO: 29.9 PG (ref 26–35)
MCHC RBC AUTO-ENTMCNC: 33.6 % (ref 32–34.5)
MCV RBC AUTO: 88.9 FL (ref 80–99.9)
PDW BLD-RTO: 14.4 FL (ref 11.5–15)
PLATELET # BLD: 184 E9/L (ref 130–450)
PMV BLD AUTO: 11.5 FL (ref 7–12)
POTASSIUM SERPL-SCNC: 3.3 MMOL/L (ref 3.5–5)
RBC # BLD: 3.61 E12/L (ref 3.5–5.5)
SODIUM BLD-SCNC: 133 MMOL/L (ref 132–146)
WBC # BLD: 11.5 E9/L (ref 4.5–11.5)

## 2021-11-01 PROCEDURE — 85027 COMPLETE CBC AUTOMATED: CPT

## 2021-11-01 PROCEDURE — 99225 PR SBSQ OBSERVATION CARE/DAY 25 MINUTES: CPT | Performed by: FAMILY MEDICINE

## 2021-11-01 PROCEDURE — 6370000000 HC RX 637 (ALT 250 FOR IP): Performed by: ORTHOPAEDIC SURGERY

## 2021-11-01 PROCEDURE — 97530 THERAPEUTIC ACTIVITIES: CPT

## 2021-11-01 PROCEDURE — 80048 BASIC METABOLIC PNL TOTAL CA: CPT

## 2021-11-01 PROCEDURE — 97110 THERAPEUTIC EXERCISES: CPT

## 2021-11-01 PROCEDURE — 36415 COLL VENOUS BLD VENIPUNCTURE: CPT

## 2021-11-01 PROCEDURE — G0378 HOSPITAL OBSERVATION PER HR: HCPCS

## 2021-11-01 RX ADMIN — ISOSORBIDE MONONITRATE 60 MG: 60 TABLET, EXTENDED RELEASE ORAL at 09:52

## 2021-11-01 RX ADMIN — VALSARTAN 160 MG: 160 TABLET, FILM COATED ORAL at 09:52

## 2021-11-01 RX ADMIN — ESCITALOPRAM OXALATE 10 MG: 10 TABLET ORAL at 09:52

## 2021-11-01 RX ADMIN — RANOLAZINE 500 MG: 500 TABLET, FILM COATED, EXTENDED RELEASE ORAL at 21:25

## 2021-11-01 RX ADMIN — HYDROCHLOROTHIAZIDE 12.5 MG: 12.5 TABLET ORAL at 09:52

## 2021-11-01 RX ADMIN — RIVAROXABAN 20 MG: 20 TABLET, FILM COATED ORAL at 18:26

## 2021-11-01 RX ADMIN — ACETAMINOPHEN 500 MG: 500 TABLET ORAL at 18:25

## 2021-11-01 RX ADMIN — ATORVASTATIN CALCIUM 40 MG: 40 TABLET, FILM COATED ORAL at 09:51

## 2021-11-01 RX ADMIN — AMLODIPINE BESYLATE 5 MG: 5 TABLET ORAL at 09:52

## 2021-11-01 RX ADMIN — METOPROLOL SUCCINATE 100 MG: 100 TABLET, EXTENDED RELEASE ORAL at 09:52

## 2021-11-01 RX ADMIN — ASPIRIN 81 MG: 81 TABLET, COATED ORAL at 09:52

## 2021-11-01 RX ADMIN — RANOLAZINE 500 MG: 500 TABLET, FILM COATED, EXTENDED RELEASE ORAL at 09:53

## 2021-11-01 RX ADMIN — FAMOTIDINE 20 MG: 20 TABLET, FILM COATED ORAL at 09:52

## 2021-11-01 RX ADMIN — ACETAMINOPHEN 500 MG: 500 TABLET ORAL at 09:52

## 2021-11-01 ASSESSMENT — PAIN DESCRIPTION - PAIN TYPE
TYPE: ACUTE PAIN;SURGICAL PAIN

## 2021-11-01 ASSESSMENT — PAIN DESCRIPTION - ORIENTATION
ORIENTATION: LEFT

## 2021-11-01 ASSESSMENT — PAIN SCALES - GENERAL
PAINLEVEL_OUTOF10: 0

## 2021-11-01 ASSESSMENT — PAIN DESCRIPTION - LOCATION
LOCATION: KNEE

## 2021-11-01 NOTE — DISCHARGE SUMMARY
Progress Note                    [facility] Progress Note    Admitting Date and Time: 10/27/2021  7:49 PM  Admit Dx: Knee pain [M25.569]  Gait instability [R26.81]  Closed displaced transverse fracture of left patella, initial encounter [S82.032A]    Subjective:  Patient is being followed for Knee pain [M25.569]  Gait instability [R26.81]  Closed displaced transverse fracture of left patella, initial encounter [S82.032A]   Pt feels well. No new concerns. Needs to be sitter free x 24 hours for discharge  Per RN: concerns that patient is trying to get up especially at night. Concern for falls given immobilizer on the left leg. ROS: denies fever, chills, cp, sob, n/v, HA unless stated above.       rivaroxaban  20 mg Oral Daily with breakfast    sodium chloride flush  5-40 mL IntraVENous 2 times per day    famotidine  20 mg Oral Daily    Or    famotidine (PEPCID) injection  20 mg IntraVENous Daily    amLODIPine  5 mg Oral Daily    aspirin EC  81 mg Oral QAM    atorvastatin  40 mg Oral QAM    escitalopram  10 mg Oral Daily    isosorbide mononitrate  60 mg Oral Daily    metoprolol succinate  100 mg Oral Daily    ranolazine  500 mg Oral BID    valsartan  160 mg Oral Daily    And    hydroCHLOROthiazide  12.5 mg Oral Daily     sodium chloride flush, 5-40 mL, PRN  sodium chloride, 25 mL, PRN  ondansetron, 4 mg, Q8H PRN   Or  ondansetron, 4 mg, Q6H PRN  oxyCODONE, 5 mg, Q6H PRN  morphine, 2 mg, Q4H PRN  acetaminophen, 500 mg, Q6H PRN  traMADol, 50 mg, Daily PRN         Objective:    /83   Pulse 90   Temp 98.8 °F (37.1 °C)   Resp 16   Ht 5' 2\" (1.575 m)   Wt 145 lb (65.8 kg)   SpO2 96%   BMI 26.52 kg/m²     General Appearance: alert and oriented to person, place and time and in no acute distress  Skin: warm and dry  Head: normocephalic and atraumatic  Eyes: pupils equal, round, and reactive to light, extraocular eye movements intact, conjunctivae normal  Neck: neck supple and non tender without mass   Pulmonary/Chest: clear to auscultation bilaterally- no wheezes, rales or rhonchi, normal air movement, no respiratory distress  Cardiovascular: normal rate, normal S1 and S2 and no carotid bruits  Abdomen: soft, non-tender, non-distended, normal bowel sounds, no masses or organomegaly  Extremities: no cyanosis, no clubbing and no edema  Neurologic: no cranial nerve deficit and speech normal        Recent Labs     10/31/21  0305 11/01/21  1000 11/02/21  0437    133 134   K 4.2 3.3* 3.7    99 98   CO2 23 23 23   BUN 27* 19 14   CREATININE 0.7 0.8 0.7   GLUCOSE 114* 122* 99   CALCIUM 9.5 9.7 9.6       Recent Labs     10/31/21  0305 11/01/21  1000 11/02/21  0437   WBC 19.5* 11.5 11.7*   RBC 3.61 3.61 3.70   HGB 10.8* 10.8* 10.9*   HCT 32.1* 32.1* 33.3*   MCV 88.9 88.9 90.0   MCH 29.9 29.9 29.5   MCHC 33.6 33.6 32.7   RDW 14.6 14.4 14.3    184 213   MPV 11.6 11.5 11.9       Assessment:    Principal Problem:    Displaced transverse fracture of left patella, initial encounter for closed fracture  Active Problems:    Essential hypertension    Coronary artery disease involving native coronary artery of native heart without angina pectoris    Knee pain    Chronic atrial fibrillation (HCC)    Dementia without behavioral disturbance (HCC)  Resolved Problems:    * No resolved hospital problems. *      Plan:  1.  1.  S/P ORIF left patella-care per ortho; will need subacute rehab; likely discharge Monday  2. Atrial fibrillation-continue metoprolol. Resume xarelto and stay on long term. 3.  Dementia-continue rivastigmine; patient still has a sitter. 4. CAD-continue aspirin and statin      NOTE: This report was transcribed using voice recognition software. Every effort was made to ensure accuracy; however, inadvertent computerized transcription errors may be present.   Electronically signed by Juli Whipple MD on 11/2/2021 at 9:22 AM

## 2021-11-01 NOTE — CARE COORDINATION
Social Work:    Advised by Dr. Talat Tony that Mrs. Ruiz can transfer to [facility] if they will accept today. Social work spoke with Mariia in admissions at [facility] and she advised that Mrs. Ruiz qualified for click six admission, so no need to await auth, however, [facility] does require that she be sitter free 24 hrs prior to admission.     Electronically signed by COLTEN Callahan on 11/1/2021 at 11:20 AM

## 2021-11-01 NOTE — PLAN OF CARE
Problem: Falls - Risk of:  Goal: Will remain free from falls  Description: Will remain free from falls  Outcome: Met This Shift  Goal: Absence of physical injury  Description: Absence of physical injury  Outcome: Met This Shift     Problem: Skin Integrity:  Goal: Will show no infection signs and symptoms  Description: Will show no infection signs and symptoms  Outcome: Met This Shift  Goal: Absence of new skin breakdown  Description: Absence of new skin breakdown  Outcome: Met This Shift     Problem: Infection - Surgical Site:  Goal: Will show no infection signs and symptoms  Description: Will show no infection signs and symptoms  Outcome: Met This Shift

## 2021-11-01 NOTE — PROGRESS NOTES
Daily Treat  Evaluating PT:  Nicoledahlia Ceron, PT, DPT [id number]        Room #:  9597/6188-W  Diagnosis:  Knee pain [M25.569]  Gait instability [R26.81]  Closed displaced transverse fracture of left patella, initial encounter [S82.032A]  PMHx/PSHx:  HTN, OA, Shoulder pain, hyperlipidemia, NSTEMI, CAD, A-fib, Appendectomy, Parotidectomy, Cardiac catheterization 11/2017  Procedure/Surgery:  10/29/2021 ORIF left patella  Precautions:  Falls, LLE in KI/Extension at all times, 50% WB LLE, Purewick catheter  Equipment Needs:  Wheeled Walker     SUBJECTIVE:     Pt lives alone in a 1 story home with 1 stairs to enter and 1 rail. Pt ambulated with no AD PTA. Pt's daughter reported family assisting with medicine management and transportation. Pt able to shower and dress self.     OBJECTIVE:    Initial Evaluation  Date: 10/29/2021 Treatment Date:  11/1/21 Short Term/ Long Term   Goals   AM-PAC 6 Clicks 09/23 27/07      Was pt agreeable to Eval/treatment? Yes  Yes       Does pt have pain? No c/o pain No complaints     Bed Mobility  Rolling: Mod A  Supine to sit: Mod A  Sit to supine: Mod A  Scooting: Mod A Supine to sit: Mod A  Scooting: Mod A seated to EOB   Rolling: Min A  Supine to sit: Min A  Sit to supine: Min A  Scooting: Min A   Transfers Sit to stand: Mod A +2  Stand to sit: Mod A +2  Stand pivot: NT Sit to stand: Mod A  Stand to sit: Max A  Stand Pivot: Mod A Sit to stand: Min A   Stand to sit: Min A  Stand pivot: Mod A   Ambulation    2 feet laterally with WW with Mod A NT >10 feet with WW with Mod A   Stair negotiation: ascended and descended  NT NA  NA   ROM BUE:  WFL  BLE:  RLE WFL, LLE in KI      Strength BUE:  4/5  BLE:  RLE 4+/5, LLE NT  Increase strength 1/3 grade. Balance Sitting EOB:  SBA  Dynamic Standing:   Mod A +2  Sitting EOB:  Supervision     Pt is alert and able to follow instruction  Balance: poor during pivot transfer using [employer] for support    Pt performed therapeutic exercise of the following: supine B ankle pumps x 30 AROM, L LE quad sets, glut sets AROM; L LE SLR motions and hip ABd/ADd AA/PROM x 20    Patient education  Pt was educated on exercise promoting circulation and strengthening, UE usage to assist with transfers, pivot transfer promoting 50% PWB L LE with use of UEs to assist, staying within [employer] base of support    Patient response to education:   Pt verbalized understanding Pt demonstrated skill Pt requires further education in this area   yes With repeated instruction during pivot transfer yes     ASSESSMENT:   Comments: Pt found in bed, agreed to Rx, exercise performed. Pt assisted to EOB, sat EOB SBA for balance. Pivot performed bed to chair using [employer] for support. Pt able to maintain 50% PWB with occasional prompt. Pt fatigued after activity. Pt unsteady during pivot transfer, remains a strong fall risk without support presently. Treatment: Pt practiced and was instructed in the following treatment: therapeutic exercise, transfer safety, WB status    Pt was left in a recliner chair with call light in reach. Chair/bed alarm: NA, staff member present    Time in 0911   Time out 0943   Total Treatment Time 32 minutes   CPT codes:     Therapeutic activities 10590 12 minutes   Therapeutic exercises 24548 20 minutes       Pt is making fair progress toward established Physical Therapy goals. Continue with physical therapy current plan of care.     Maddie Tijerina PTA   License Number: [id number]

## 2021-11-01 NOTE — PROGRESS NOTES
Occupational Therapy  Patient treatment attempted this AM 2x. Patient eating breakfast.  Returned later and pt working with PT. Will attempt at a later time.   Brain Mery MATTHEW/L [id number]

## 2021-11-01 NOTE — PROGRESS NOTES
Family at nursing station voices concerns regarding patient not getting clean up today poor    Nursing care done  Complete bath and complete bed change done. Changed pur wick set up per family request and comfort care done for patient. Sitter continue at bedside bed alarm on. Hourly rounds continued.

## 2021-11-02 LAB
ANION GAP SERPL CALCULATED.3IONS-SCNC: 13 MMOL/L (ref 7–16)
BUN BLDV-MCNC: 14 MG/DL (ref 6–23)
CALCIUM SERPL-MCNC: 9.6 MG/DL (ref 8.6–10.2)
CHLORIDE BLD-SCNC: 98 MMOL/L (ref 98–107)
CO2: 23 MMOL/L (ref 22–29)
CREAT SERPL-MCNC: 0.7 MG/DL (ref 0.5–1)
GFR AFRICAN AMERICAN: >60
GFR NON-AFRICAN AMERICAN: >60 ML/MIN/1.73
GLUCOSE BLD-MCNC: 99 MG/DL (ref 74–99)
HCT VFR BLD CALC: 33.3 % (ref 34–48)
HEMOGLOBIN: 10.9 G/DL (ref 11.5–15.5)
MCH RBC QN AUTO: 29.5 PG (ref 26–35)
MCHC RBC AUTO-ENTMCNC: 32.7 % (ref 32–34.5)
MCV RBC AUTO: 90 FL (ref 80–99.9)
PDW BLD-RTO: 14.3 FL (ref 11.5–15)
PLATELET # BLD: 213 E9/L (ref 130–450)
PMV BLD AUTO: 11.9 FL (ref 7–12)
POTASSIUM SERPL-SCNC: 3.7 MMOL/L (ref 3.5–5)
RBC # BLD: 3.7 E12/L (ref 3.5–5.5)
SODIUM BLD-SCNC: 134 MMOL/L (ref 132–146)
WBC # BLD: 11.7 E9/L (ref 4.5–11.5)

## 2021-11-02 PROCEDURE — 85027 COMPLETE CBC AUTOMATED: CPT

## 2021-11-02 PROCEDURE — 6370000000 HC RX 637 (ALT 250 FOR IP): Performed by: ORTHOPAEDIC SURGERY

## 2021-11-02 PROCEDURE — 80048 BASIC METABOLIC PNL TOTAL CA: CPT

## 2021-11-02 PROCEDURE — 97530 THERAPEUTIC ACTIVITIES: CPT

## 2021-11-02 PROCEDURE — G0378 HOSPITAL OBSERVATION PER HR: HCPCS

## 2021-11-02 PROCEDURE — 36415 COLL VENOUS BLD VENIPUNCTURE: CPT

## 2021-11-02 PROCEDURE — 99225 PR SBSQ OBSERVATION CARE/DAY 25 MINUTES: CPT | Performed by: FAMILY MEDICINE

## 2021-11-02 RX ADMIN — AMLODIPINE BESYLATE 5 MG: 5 TABLET ORAL at 09:46

## 2021-11-02 RX ADMIN — HYDROCHLOROTHIAZIDE 12.5 MG: 12.5 TABLET ORAL at 09:47

## 2021-11-02 RX ADMIN — METOPROLOL SUCCINATE 100 MG: 100 TABLET, EXTENDED RELEASE ORAL at 09:47

## 2021-11-02 RX ADMIN — VALSARTAN 160 MG: 160 TABLET, FILM COATED ORAL at 09:47

## 2021-11-02 RX ADMIN — RANOLAZINE 500 MG: 500 TABLET, FILM COATED, EXTENDED RELEASE ORAL at 09:47

## 2021-11-02 RX ADMIN — ISOSORBIDE MONONITRATE 60 MG: 60 TABLET, EXTENDED RELEASE ORAL at 09:47

## 2021-11-02 RX ADMIN — ATORVASTATIN CALCIUM 40 MG: 40 TABLET, FILM COATED ORAL at 09:46

## 2021-11-02 RX ADMIN — FAMOTIDINE 20 MG: 20 TABLET, FILM COATED ORAL at 09:46

## 2021-11-02 RX ADMIN — ESCITALOPRAM OXALATE 10 MG: 10 TABLET ORAL at 09:47

## 2021-11-02 RX ADMIN — RIVAROXABAN 20 MG: 20 TABLET, FILM COATED ORAL at 18:40

## 2021-11-02 RX ADMIN — ACETAMINOPHEN 500 MG: 500 TABLET ORAL at 09:46

## 2021-11-02 RX ADMIN — ACETAMINOPHEN 500 MG: 500 TABLET ORAL at 16:04

## 2021-11-02 RX ADMIN — ASPIRIN 81 MG: 81 TABLET, COATED ORAL at 09:46

## 2021-11-02 RX ADMIN — RANOLAZINE 500 MG: 500 TABLET, FILM COATED, EXTENDED RELEASE ORAL at 20:55

## 2021-11-02 ASSESSMENT — PAIN SCALES - GENERAL
PAINLEVEL_OUTOF10: 0

## 2021-11-02 NOTE — PLAN OF CARE
Problem: Falls - Risk of:  Goal: Will remain free from falls  Description: Will remain free from falls  11/2/2021 1118 by Diana Block RN  Outcome: Met This Shift     Problem: Falls - Risk of:  Goal: Absence of physical injury  Description: Absence of physical injury  11/2/2021 1118 by Diana Block RN  Outcome: Met This Shift     Problem: Skin Integrity:  Goal: Will show no infection signs and symptoms  Description: Will show no infection signs and symptoms  11/2/2021 1118 by Diana Block RN  Outcome: Met This Shift     Problem: Skin Integrity:  Goal: Absence of new skin breakdown  Description: Absence of new skin breakdown  11/2/2021 1118 by Diana Block RN  Outcome: Met This Shift     Problem: Infection - Surgical Site:  Goal: Will show no infection signs and symptoms  Description: Will show no infection signs and symptoms  11/2/2021 1118 by Diana Block RN  Outcome: Met This Shift

## 2021-11-02 NOTE — PROGRESS NOTES
Physical Therapy  Facility/Department: [address] ORTHO SURGERY  Daily Treatment Note  NAME: Mary Ann Schaeffer  : 1940  MRN: [id number]    Date of Service: 2021    Patient Diagnosis(es): The primary encounter diagnosis was Closed displaced transverse fracture of left patella, initial encounter. Diagnoses of Gait instability and Pain were also pertinent to this visit. has a past medical history of Atrial fibrillation ([facility] [address] Coronary artery disease involving native coronary artery of native heart without angina pectoris, Hyperlipidemia, Hypertension, NSTEMI (non-ST elevated myocardial infarction) ([facility] [address] Osteoarthritis, and Shoulder pain. has a past surgical history that includes Appendectomy; Parotidectomy; Cardiac catheterization (2017); and Patella fracture surgery (Left, 10/29/2021). Evaluating PT: Rebekah Herrera PT, DPT [id number]        Room #:  4926/6051-V  Diagnosis:  Knee pain [M25.569]  Gait instability [R26.81]  Closed displaced transverse fracture of left patella, initial encounter [S82.032A]  PMHx/PSHx:  HTN, OA, Shoulder pain, hyperlipidemia, NSTEMI, CAD, A-fib, Appendectomy, Parotidectomy, Cardiac catheterization 2017  Procedure/Surgery:  10/29/2021 ORIF left patella  Precautions:  Falls, LLE in KI/Extension at all times, 50% WB LLE, Purewick catheter  Equipment Needs:  Wheeled Walker     SUBJECTIVE:     Pt lives alone in a 1 story home with 1 stairs to enter and 1 rail. Pt ambulated with no AD PTA. Pt's daughter reported family assisting with medicine management and transportation. Pt able to shower and dress self.     OBJECTIVE:    Initial Evaluation  Date: 10/29/2021 Treatment Date:   Short Term/ Long Term   Goals   AM-PAC 6 Clicks  71/59     Was pt agreeable to Eval/treatment? Yes  Yes       Does pt have pain?  No c/o pain No complaints     Bed Mobility  Rolling: Mod A  Supine to sit: Mod A  Sit to supine: Mod A  Scooting: Mod A NT  Rolling: Min A  Supine to sit: Min 8429  Time out  0921    Total Treatment Time  11 minutes     CPT codes:    [x] Therapeutic activities 04384 8minutes  [] Therapeutic exercises [address] [facility], [address]

## 2021-11-02 NOTE — PROGRESS NOTES
[facility] Progress Note    Admitting Date and Time: 10/27/2021  7:49 PM  Admit Dx: Knee pain [M25.569]  Gait instability [R26.81]  Closed displaced transverse fracture of left patella, initial encounter [S82.032A]    Subjective:  Patient is being followed for Knee pain [M25.569]  Gait instability [R26.81]  Closed displaced transverse fracture of left patella, initial encounter [S82.032A]   Pt feels well this morning. Upset about the sitter she had last night. Upset that she cannot get up when she wants to. Pain is well controlled. No new concerns otherwise. Looking into a facility with a memory unit.-discussed with social work. ROS: denies fever, chills, cp, sob, n/v, HA unless stated above.       rivaroxaban  20 mg Oral Daily with breakfast    sodium chloride flush  5-40 mL IntraVENous 2 times per day    famotidine  20 mg Oral Daily    Or    famotidine (PEPCID) injection  20 mg IntraVENous Daily    amLODIPine  5 mg Oral Daily    aspirin EC  81 mg Oral QAM    atorvastatin  40 mg Oral QAM    escitalopram  10 mg Oral Daily    isosorbide mononitrate  60 mg Oral Daily    metoprolol succinate  100 mg Oral Daily    ranolazine  500 mg Oral BID    valsartan  160 mg Oral Daily    And    hydroCHLOROthiazide  12.5 mg Oral Daily     sodium chloride flush, 5-40 mL, PRN  sodium chloride, 25 mL, PRN  ondansetron, 4 mg, Q8H PRN   Or  ondansetron, 4 mg, Q6H PRN  oxyCODONE, 5 mg, Q6H PRN  morphine, 2 mg, Q4H PRN  acetaminophen, 500 mg, Q6H PRN  traMADol, 50 mg, Daily PRN         Objective:    /83   Pulse 90   Temp 98.8 °F (37.1 °C)   Resp 16   Ht 5' 2\" (1.575 m)   Wt 145 lb (65.8 kg)   SpO2 96%   BMI 26.52 kg/m²     General Appearance: alert and oriented to person, place and time and in no acute distress  Skin: warm and dry  Head: normocephalic and atraumatic  Eyes: pupils equal, round, and reactive to light, extraocular eye movements intact, conjunctivae normal  Neck: neck supple and non tender without mass   Pulmonary/Chest: clear to auscultation bilaterally- no wheezes, rales or rhonchi, normal air movement, no respiratory distress  Cardiovascular: normal rate, normal S1 and S2 and no carotid bruits  Abdomen: soft, non-tender, non-distended, normal bowel sounds, no masses or organomegaly  Extremities: no cyanosis, no clubbing and no edema  Neurologic: no cranial nerve deficit and speech normal        Recent Labs     10/31/21  0305 11/01/21  1000 11/02/21  0437    133 134   K 4.2 3.3* 3.7    99 98   CO2 23 23 23   BUN 27* 19 14   CREATININE 0.7 0.8 0.7   GLUCOSE 114* 122* 99   CALCIUM 9.5 9.7 9.6       Recent Labs     10/31/21  0305 11/01/21  1000 11/02/21  0437   WBC 19.5* 11.5 11.7*   RBC 3.61 3.61 3.70   HGB 10.8* 10.8* 10.9*   HCT 32.1* 32.1* 33.3*   MCV 88.9 88.9 90.0   MCH 29.9 29.9 29.5   MCHC 33.6 33.6 32.7   RDW 14.6 14.4 14.3    184 213   MPV 11.6 11.5 11.9         Assessment:    Principal Problem:    Displaced transverse fracture of left patella, initial encounter for closed fracture  Active Problems:    Essential hypertension    Coronary artery disease involving native coronary artery of native heart without angina pectoris    Knee pain    Chronic atrial fibrillation (HCC)    Dementia without behavioral disturbance (HCC)  Resolved Problems:    * No resolved hospital problems. *      Plan:  1. S/P ORIF left patella-care per ortho; will need subacute rehab; likely discharge Monday  2.  Atrial fibrillation-continue metoprolol.  Resume xarelto and stay on long term. 3.  Dementia-continue rivastigmine; patient still has a sitter; looking into a facility with a memory unit  4.  CAD-continue aspirin and statin    Patient is medically clear for discharge. Awaiting placement. NOTE: This report was transcribed using voice recognition software.  Every effort was made to ensure accuracy; however, inadvertent computerized transcription errors may be present.   Electronically signed by Alfredito Uriarte MD on 11/2/2021 at 9:25 AM

## 2021-11-02 NOTE — PROGRESS NOTES
activities to improve problem solving, judgement, memory, and attention for increased safety/participation in ADL/IADL tasks  * Therapeutic exercise to improve motor endurance, ROM, and functional strength for ADLs/functional transfers  * Therapeutic activities to facilitate/challenge dynamic balance, stand tolerance for increased safety and independence with ADLs           Recommended Adaptive Equipment: Shower chair, wheeled walker     Home Living: Pt confused during session. Per daughter, pt lives alone in 1 story condo with 1 GRACY and 1 hand rail . Bathroom setup: tub/shower   Equipment owned: none     Prior Level of Function: Per daughter, pt independent with ADLs , assist from family with IADLs (medication management, transportation, meal prep); functional mobility: no device     Pain Level: Pt reported no pain this session  Cognition: A&O:pleasant, following commands. Confusion and forgetfulness noted                 Functional Assessment:  AM-PAC Daily Activity Raw Score: 15/24    Initial Eval Status  Date: 10/29/21 Treatment Status  Date:11/2/2021 STGs = LTGs  Time frame: 10-14 days   Feeding I       Grooming Mod A to d/t decreased standing tolerance Min A  Standing at sink washing hands   SBA    UB Dressing Mod A   SBA   LB Dressing Max A    Mod A-with use of AD as appropriate/needed   Bathing Mod A   Min A -with use of AD as appropriate/needed   Toileting Mod A Patient able to complete hygiene - assist with thoroughness   Min A    Bed Mobility  Supine to sit: Mod A   Sit to supine: Mod A  Assist with BLE  Up in bathroom upon entry   Min A to improve participation and safety with ADLs and functional tasks   Functional Transfers Mod A x2 with wheeled walker  Sit <>stand from EOB  Education provided on WB status and cues for hand placement. Min A  Sit-stand from commode   SBA with adherence to WB status    Functional Mobility Mod A with wheeled walker for shuffle side steps toward HOB.   Cues for safe wheeled walker management and navigation. Min A,w/walker   Ambulating from bathroom   Much cueing for weight bearing precaution - patient not maintaining  - forgetful  SBA -with device as needed to maximize independence with ADLs and functional task completion and adherence to WB status   Balance Sitting:     Static:  SBA    Dynamic:Mod A X2   Standing: Mod A with wheeled walker Sitting - supervision  Standing - Vickey Mckeon SBA/supervision  with standing balance to maximize independence with ADLs and functional task completion   Activity Tolerance Fair- with light activity Fair with light activity  Good  with ADL activity. Visual/  Perceptual Glasses: yes         UE AROM WFLS for ADL activity    Education: safety awareness, weight bearing precaution      Comments: Upon arrival pt in bathroom . At end of session sitting in recline r all lines and tubes intact, call light within reach. Sitter and daughter present in room       · Pt has made fair  progress towards set goals.    · Continue with current plan of care      Time in: 0910  Time out:924                Treatment Charges: Mins Units   Ther Ex  [id number]     Manual Therapy Marcie David [id number]   ADL/Home Mgt [id number]     Neuro Re-ed [id number]     Group Therapy      Orthotic manage/training  [id number]     Non-Billable Time     Total Timed Treatment            Bryan Slater OTR/L [id number]

## 2021-11-02 NOTE — PLAN OF CARE
Problem: Falls - Risk of:  Goal: Will remain free from falls  Description: Will remain free from falls  11/2/2021 0054 by Pavel Fowler RN  Outcome: Met This Shift  11/1/2021 1337 by Elmo Duncan RN  Outcome: Met This Shift  Goal: Absence of physical injury  Description: Absence of physical injury  11/2/2021 0054 by Pavel Fowler RN  Outcome: Met This Shift  11/1/2021 1337 by Elmo Duncan RN  Outcome: Met This Shift     Problem: Skin Integrity:  Goal: Will show no infection signs and symptoms  Description: Will show no infection signs and symptoms  11/2/2021 0054 by Pavel Fowler RN  Outcome: Met This Shift  11/1/2021 1337 by Elmo Duncan RN  Outcome: Met This Shift  Goal: Absence of new skin breakdown  Description: Absence of new skin breakdown  11/2/2021 0054 by Pavel Fowler RN  Outcome: Met This Shift  11/1/2021 1337 by Elmo Duncan RN  Outcome: Met This Shift

## 2021-11-02 NOTE — CARE COORDINATION
Social Work:    Social service made a follow-up visit today with Mrs. Ely Cash and her daughter Chavez Barone. Social service also spoke with another daughter Tami Ewing via phone. Social service explained to daughter Chavez Barone that [facility] requires patient's to be sitter free for 24 hrs prior to admission, which may not be plausible due to Mrs. Ruiz's improved but still disoriented state. Chavez Barone understood and explained that her mother lived alone and though she was becoming more forgetful her present state appears to be exacerbated. We discussed transferring into a skilled rehab with a memory care unit to ensure her mother's safety and Chavez Barone and Tami Kaylin requested [address] [facility] [place] [facility]. No beds are open presently at [address], therefore a referral was given to [facility] at [place] [address]. Await TAV snf evaluation.     Electronically signed by COLTEN Salazar on 11/2/2021 at 10:10 AM

## 2021-11-03 VITALS
RESPIRATION RATE: 16 BRPM | WEIGHT: 145 LBS | OXYGEN SATURATION: 98 % | SYSTOLIC BLOOD PRESSURE: 110 MMHG | TEMPERATURE: 97.7 F | BODY MASS INDEX: 26.68 KG/M2 | HEIGHT: 62 IN | DIASTOLIC BLOOD PRESSURE: 53 MMHG | HEART RATE: 94 BPM

## 2021-11-03 LAB
ANION GAP SERPL CALCULATED.3IONS-SCNC: 14 MMOL/L (ref 7–16)
BUN BLDV-MCNC: 11 MG/DL (ref 6–23)
CALCIUM SERPL-MCNC: 9.8 MG/DL (ref 8.6–10.2)
CHLORIDE BLD-SCNC: 99 MMOL/L (ref 98–107)
CO2: 23 MMOL/L (ref 22–29)
CREAT SERPL-MCNC: 0.7 MG/DL (ref 0.5–1)
GFR AFRICAN AMERICAN: >60
GFR NON-AFRICAN AMERICAN: >60 ML/MIN/1.73
GLUCOSE BLD-MCNC: 109 MG/DL (ref 74–99)
HCT VFR BLD CALC: 33.1 % (ref 34–48)
HEMOGLOBIN: 10.8 G/DL (ref 11.5–15.5)
MCH RBC QN AUTO: 29.2 PG (ref 26–35)
MCHC RBC AUTO-ENTMCNC: 32.6 % (ref 32–34.5)
MCV RBC AUTO: 89.5 FL (ref 80–99.9)
PDW BLD-RTO: 14.2 FL (ref 11.5–15)
PLATELET # BLD: 239 E9/L (ref 130–450)
PMV BLD AUTO: 11.7 FL (ref 7–12)
POTASSIUM SERPL-SCNC: 3.7 MMOL/L (ref 3.5–5)
RBC # BLD: 3.7 E12/L (ref 3.5–5.5)
SARS-COV-2, NAAT: NOT DETECTED
SODIUM BLD-SCNC: 136 MMOL/L (ref 132–146)
URINE CULTURE, ROUTINE: NORMAL
WBC # BLD: 11.7 E9/L (ref 4.5–11.5)

## 2021-11-03 PROCEDURE — 87635 SARS-COV-2 COVID-19 AMP PRB: CPT

## 2021-11-03 PROCEDURE — 36415 COLL VENOUS BLD VENIPUNCTURE: CPT

## 2021-11-03 PROCEDURE — G0378 HOSPITAL OBSERVATION PER HR: HCPCS

## 2021-11-03 PROCEDURE — 85027 COMPLETE CBC AUTOMATED: CPT

## 2021-11-03 PROCEDURE — 99217 PR OBSERVATION CARE DISCHARGE MANAGEMENT: CPT | Performed by: FAMILY MEDICINE

## 2021-11-03 PROCEDURE — 80048 BASIC METABOLIC PNL TOTAL CA: CPT

## 2021-11-03 PROCEDURE — 6370000000 HC RX 637 (ALT 250 FOR IP): Performed by: ORTHOPAEDIC SURGERY

## 2021-11-03 RX ADMIN — RANOLAZINE 500 MG: 500 TABLET, FILM COATED, EXTENDED RELEASE ORAL at 09:35

## 2021-11-03 RX ADMIN — HYDROCHLOROTHIAZIDE 12.5 MG: 12.5 TABLET ORAL at 09:36

## 2021-11-03 RX ADMIN — ASPIRIN 81 MG: 81 TABLET, COATED ORAL at 09:36

## 2021-11-03 RX ADMIN — VALSARTAN 160 MG: 160 TABLET, FILM COATED ORAL at 09:36

## 2021-11-03 RX ADMIN — ATORVASTATIN CALCIUM 40 MG: 40 TABLET, FILM COATED ORAL at 09:35

## 2021-11-03 RX ADMIN — ESCITALOPRAM OXALATE 10 MG: 10 TABLET ORAL at 09:36

## 2021-11-03 RX ADMIN — FAMOTIDINE 20 MG: 20 TABLET, FILM COATED ORAL at 09:36

## 2021-11-03 RX ADMIN — ACETAMINOPHEN 500 MG: 500 TABLET ORAL at 09:35

## 2021-11-03 RX ADMIN — AMLODIPINE BESYLATE 5 MG: 5 TABLET ORAL at 09:36

## 2021-11-03 RX ADMIN — ISOSORBIDE MONONITRATE 60 MG: 60 TABLET, EXTENDED RELEASE ORAL at 09:36

## 2021-11-03 RX ADMIN — METOPROLOL SUCCINATE 100 MG: 100 TABLET, EXTENDED RELEASE ORAL at 09:36

## 2021-11-03 ASSESSMENT — PAIN SCALES - GENERAL
PAINLEVEL_OUTOF10: 0
PAINLEVEL_OUTOF10: 0

## 2021-11-03 NOTE — DISCHARGE SUMMARY
[facility] Physician Discharge Summary       [facility] at [address] [facility] [place] [address]  [phone]          Activity level: As tolerated     Dispo: Adalid Chung      Condition on discharge: Stable     Patient ID:  Luis Flores  [id number]  7 y.o.  1940    Admit date: 10/27/2021    Discharge date and time:  11/3/2021  12:57 PM    Admission Diagnoses: Principal Problem:    Displaced transverse fracture of left patella, initial encounter for closed fracture  Active Problems:    Essential hypertension    Coronary artery disease involving native coronary artery of native heart without angina pectoris    Knee pain    Chronic atrial fibrillation (HCC)    Dementia without behavioral disturbance ([facility] [address]  Resolved Problems:    * No resolved hospital problems. *      Discharge Diagnoses: Principal Problem:    Displaced transverse fracture of left patella, initial encounter for closed fracture  Active Problems:    Essential hypertension    Coronary artery disease involving native coronary artery of native heart without angina pectoris    Knee pain    Chronic atrial fibrillation (HCC)    Dementia without behavioral disturbance (HCC)  Resolved Problems:    * No resolved hospital problems. *      Consults:  IP CONSULT TO ORTHOPEDIC SURGERY    Hospital Course:   Patient Luis Flores is a 78 y.o. presented with Knee pain [M25.569]  Gait instability [R26.81]  Closed displaced transverse fracture of left patella, initial encounter Dallin Doyle  Mrs. Mesha Velez was [place] and underwent ORIF of her left patella due to fracture. During her stay, she recovered well from the fracture and performed physical therapy and occupational therapy. She will need JENNIFER. She does have dementia and suffered some delirium while hospitalized. This seemed to be worse during the night time. We did obtain a urine culture that was negative for UTI.     Discharge Exam:    General Appearance: alert and oriented to person, place and time and in no acute distress  Skin: warm and dry  Head: normocephalic and atraumatic  Eyes: pupils equal, round, and reactive to light, extraocular eye movements intact, conjunctivae normal  Neck: neck supple and non tender without mass   Pulmonary/Chest: clear to auscultation bilaterally- no wheezes, rales or rhonchi, normal air movement, no respiratory distress  Cardiovascular: normal rate, normal S1 and S2 and no carotid bruits  Abdomen: soft, non-tender, non-distended, normal bowel sounds, no masses or organomegaly  Extremities: no cyanosis, no clubbing and no edema  Neurologic: no cranial nerve deficit and speech normal    I/O last 3 completed shifts:  In: -   Out: 600 [Urine:600]  No intake/output data recorded. LABS:  Recent Labs     11/01/21  1000 11/02/21  0437 11/03/21  0306    134 136   K 3.3* 3.7 3.7   CL 99 98 99   CO2 23 23 23   BUN 19 14 11   CREATININE 0.8 0.7 0.7   GLUCOSE 122* 99 109*   CALCIUM 9.7 9.6 9.8       Recent Labs     11/01/21  1000 11/02/21  0437 11/03/21  0306   WBC 11.5 11.7* 11.7*   RBC 3.61 3.70 3.70   HGB 10.8* 10.9* 10.8*   HCT 32.1* 33.3* 33.1*   MCV 88.9 90.0 89.5   MCH 29.9 29.5 29.2   MCHC 33.6 32.7 32.6   RDW 14.4 14.3 14.2    213 239   MPV 11.5 11.9 11.7       No results for input(s): POCGLU in the last 72 hours. Imaging:  XR KNEE LEFT (1-2 VIEWS)    Result Date: 10/27/2021  EXAMINATION: TWO XRAY VIEWS OF THE LEFT KNEE 10/27/2021 8:43 pm COMPARISON: None. HISTORY: ORDERING SYSTEM PROVIDED HISTORY: Fall with anterior pain TECHNOLOGIST PROVIDED HISTORY: Reason for exam:->Fall with anterior pain FINDINGS: Displaced mid patellar fracture with prepatellar soft tissue edema. Small joint effusion. No focal osseous lesion. No evidence of joint effusion. Displaced mid patellar fracture with prepatellar soft tissue edema. Small joint effusion.        Patient Instructions:      Medication List      CONTINUE taking these medications    acetaminophen 500 MG tablet  Commonly known as: TYLENOL     amLODIPine 5 MG tablet  Commonly known as: NORVASC  TAKE ONE TABLET BY MOUTH EVERY DAY     aspirin EC 81 MG EC tablet     atorvastatin 40 MG tablet  Commonly known as: LIPITOR  Take 1 tablet by mouth every morning     BIOFREEZE ROLL-ON EX     isosorbide mononitrate 60 MG extended release tablet  Commonly known as: IMDUR  TAKE ONE TABLET BY MOUTH EVERY DAY     Lexapro 10 MG tablet  Generic drug: escitalopram     metoprolol succinate 100 MG extended release tablet  Commonly known as: TOPROL XL  Take 1 tablet by mouth daily     nitroGLYCERIN 0.4 MG SL tablet  Commonly known as: Nitrostat  Place 1 tablet under the tongue every 5 minutes as needed for Chest pain     PEPCID AC PO     ranolazine 500 MG extended release tablet  Commonly known as: Ranexa  Take 1 tablet by mouth 2 times daily     rivaroxaban 20 MG Tabs tablet  Commonly known as: XARELTO  Take 1 tablet by mouth daily (with breakfast)     traMADol 50 MG tablet  Commonly known as: ULTRAM     valsartan-hydroCHLOROthiazide 160-12.5 MG per tablet  Commonly known as: DIOVAN-HCT  Take 1 tablet by mouth every morning              Note that more than 30 minutes was spent in preparing discharge papers, discussing discharge with patient, medication review, etc.    Signed:  Electronically signed by Mireya Isaac MD on 11/3/2021 at 12:57 PM

## 2021-11-03 NOTE — PROGRESS NOTES
Progress Note    Post op Day 4      S:   Complaints: Patient had some issues yesterday about falling. She is being monitored closely. She is somewhat disoriented to place and time. Normal for her cognition. O:     Vitals:    11/02/21 1914   BP: 117/71   Pulse: 77   Resp: 16   Temp: 97.5 °F (36.4 °C)   SpO2: 97%        Dressing clean/dry/intact   Femoral/Sciatic intact   Calf - soft     H/H:   Recent Labs     11/03/21  0306   HGB 10.8*   HCT 33.1*   Creatinine 0.7   PT/INR: No results for input(s): PROT, INR in the last 72 hours. A/P:   Status post ORIF left patella secondary to fracture. Continue with anticoagulation as prescribed. Pain medication as per medicine. Discharge planning for subacute rehab skilled nursing facility today. Weightbearing as tolerated in the knee immobilizer.   Follow-up Dr. Cornell Dewey 2 weeks        Electronically signed by YVONEN Johnston on 11/3/2021 at 7:43 AM

## 2021-11-03 NOTE — CARE COORDINATION
Social Work:    Charge RN Vernell updated social service this morning. Mrs. Chris Ca will be sitter free 24 hrs at 2:00 p.m. Social service arranged [facility] ambulance to transfer Mrs. Ruiz to [facility] today at 2:00 p.m. Social service met with Mrs. Chris Ca and her daughter Abel Motley and updated them, as well as Mariia at [facility].     Electronically signed by COLTEN Maxwell on 11/3/2021 at 9:39 AM

## 2021-11-03 NOTE — PLAN OF CARE
Problem: Falls - Risk of:  Goal: Will remain free from falls  Description: Will remain free from falls  Outcome: Completed  Goal: Absence of physical injury  Description: Absence of physical injury  Outcome: Completed     Problem: Skin Integrity:  Goal: Will show no infection signs and symptoms  Description: Will show no infection signs and symptoms  Outcome: Completed  Goal: Absence of new skin breakdown  Description: Absence of new skin breakdown  Outcome: Completed     Problem: Infection - Surgical Site:  Goal: Will show no infection signs and symptoms  Description: Will show no infection signs and symptoms  Outcome: Completed

## 2021-12-27 ENCOUNTER — TELEPHONE (OUTPATIENT)
Dept: CARDIOLOGY CLINIC | Age: 81
End: 2021-12-27

## 2021-12-27 NOTE — TELEPHONE ENCOUNTER
Received a call from patient's daughter that patient's lower legs/ankles are extremely swollen. Pt was able to see PCP today and was given 20mg lasix wd.

## 2022-02-02 RX ORDER — RANOLAZINE 500 MG/1
500 TABLET, EXTENDED RELEASE ORAL 2 TIMES DAILY
Qty: 180 TABLET | Refills: 1 | Status: SHIPPED
Start: 2022-02-02 | End: 2022-03-08 | Stop reason: DRUGHIGH

## 2022-02-09 ENCOUNTER — OFFICE VISIT (OUTPATIENT)
Dept: CARDIOLOGY CLINIC | Age: 82
End: 2022-02-09
Payer: MEDICARE

## 2022-02-09 VITALS
RESPIRATION RATE: 18 BRPM | BODY MASS INDEX: 29 KG/M2 | HEART RATE: 73 BPM | WEIGHT: 157.6 LBS | SYSTOLIC BLOOD PRESSURE: 130 MMHG | HEIGHT: 62 IN | OXYGEN SATURATION: 98 % | DIASTOLIC BLOOD PRESSURE: 60 MMHG

## 2022-02-09 DIAGNOSIS — I48.21 ATRIAL FIBRILLATION, PERMANENT (HCC): ICD-10-CM

## 2022-02-09 DIAGNOSIS — G89.29 CHRONIC LEFT SHOULDER PAIN: ICD-10-CM

## 2022-02-09 DIAGNOSIS — Z87.81 STATUS POST OPEN REDUCTION AND INTERNAL FIXATION (ORIF) OF FRACTURE: ICD-10-CM

## 2022-02-09 DIAGNOSIS — Z87.891 EX-SMOKER: ICD-10-CM

## 2022-02-09 DIAGNOSIS — I25.10 CORONARY ARTERY DISEASE INVOLVING NATIVE CORONARY ARTERY OF NATIVE HEART WITHOUT ANGINA PECTORIS: Primary | ICD-10-CM

## 2022-02-09 DIAGNOSIS — M25.512 CHRONIC LEFT SHOULDER PAIN: ICD-10-CM

## 2022-02-09 DIAGNOSIS — I65.23 BILATERAL CAROTID ARTERY STENOSIS: ICD-10-CM

## 2022-02-09 DIAGNOSIS — M54.50 CHRONIC MIDLINE LOW BACK PAIN WITHOUT SCIATICA: ICD-10-CM

## 2022-02-09 DIAGNOSIS — G89.29 CHRONIC MIDLINE LOW BACK PAIN WITHOUT SCIATICA: ICD-10-CM

## 2022-02-09 DIAGNOSIS — I25.2 HISTORY OF NON-ST ELEVATION MYOCARDIAL INFARCTION (NSTEMI): ICD-10-CM

## 2022-02-09 DIAGNOSIS — E78.5 DYSLIPIDEMIA: ICD-10-CM

## 2022-02-09 DIAGNOSIS — Z79.01 ANTICOAGULATED BY ANTICOAGULATION TREATMENT: ICD-10-CM

## 2022-02-09 DIAGNOSIS — M47.9 OSTEOARTHRITIS OF LOWER BACK: ICD-10-CM

## 2022-02-09 DIAGNOSIS — Z98.890 STATUS POST OPEN REDUCTION AND INTERNAL FIXATION (ORIF) OF FRACTURE: ICD-10-CM

## 2022-02-09 DIAGNOSIS — F03.90 DEMENTIA WITHOUT BEHAVIORAL DISTURBANCE, UNSPECIFIED DEMENTIA TYPE: ICD-10-CM

## 2022-02-09 DIAGNOSIS — I10 ESSENTIAL HYPERTENSION: ICD-10-CM

## 2022-02-09 PROCEDURE — 99214 OFFICE O/P EST MOD 30 MIN: CPT | Performed by: INTERNAL MEDICINE

## 2022-02-09 PROCEDURE — 93000 ELECTROCARDIOGRAM COMPLETE: CPT | Performed by: INTERNAL MEDICINE

## 2022-02-09 RX ORDER — FUROSEMIDE 20 MG/1
40 TABLET ORAL DAILY
COMMUNITY

## 2022-02-09 RX ORDER — LORATADINE 10 MG/1
10 CAPSULE, LIQUID FILLED ORAL DAILY
COMMUNITY

## 2022-02-09 NOTE — PROGRESS NOTES
OFFICE VISIT        PRIMARY CARE PHYSICIAN:    Kay Robertson, III, DO       ALLERGIES / SENSITIVITIES:    Allergies   Allergen Reactions    Other Other (See Comments)     Novocaine--throat swelled up    Sulfa Antibiotics Anaphylaxis          REVIEWED MEDICATIONS:      Current Outpatient Medications:     furosemide (LASIX) 20 MG tablet, Take 20 mg by mouth daily, Disp: , Rfl:     loratadine (CLARITIN) 10 MG capsule, Take 10 mg by mouth daily, Disp: , Rfl:     ranolazine (RANEXA) 500 MG extended release tablet, Take 1 tablet by mouth 2 times daily, Disp: 180 tablet, Rfl: 1    amLODIPine (NORVASC) 5 MG tablet, TAKE ONE TABLET BY MOUTH EVERY DAY, Disp: 90 tablet, Rfl: 3    escitalopram (LEXAPRO) 10 MG tablet, Take 10 mg by mouth daily, Disp: , Rfl:     valsartan-hydroCHLOROthiazide (DIOVAN-HCT) 160-12.5 MG per tablet, Take 1 tablet by mouth every morning, Disp: 90 tablet, Rfl: 3    isosorbide mononitrate (IMDUR) 60 MG extended release tablet, TAKE ONE TABLET BY MOUTH EVERY DAY, Disp: 90 tablet, Rfl: 3    atorvastatin (LIPITOR) 40 MG tablet, Take 1 tablet by mouth every morning, Disp: 90 tablet, Rfl: 3    rivaroxaban (XARELTO) 20 MG TABS tablet, Take 1 tablet by mouth daily (with breakfast), Disp: 90 tablet, Rfl: 3    metoprolol succinate (TOPROL XL) 100 MG extended release tablet, Take 1 tablet by mouth daily, Disp: 90 tablet, Rfl: 5    traMADol (ULTRAM) 50 MG tablet, Take 50 mg by mouth daily as needed.  , Disp: , Rfl:     nitroGLYCERIN (NITROSTAT) 0.4 MG SL tablet, Place 1 tablet under the tongue every 5 minutes as needed for Chest pain, Disp: 25 tablet, Rfl: 3    acetaminophen (TYLENOL) 325 MG tablet, Take 325 mg by mouth every 6 hours as needed for Pain , Disp: , Rfl:     Famotidine (PEPCID AC PO), Take 20 mg by mouth daily as needed, Disp: , Rfl:     Menthol, Topical Analgesic, (BIOFREEZE ROLL-ON EX), Apply topically as needed , Disp: , Rfl:     aspirin EC 81 MG EC tablet, Take 81 mg by mouth every morning , Disp: , Rfl:       S: REASON FOR VISIT:    Severe calcific 3 vessel coronary artery disease with history of non ST elevation myocardial infarction in 11/2017, at which time patient declined surgical revascularization. Chronic atrial fibrillation. Paresh Justice is a pleasant, 68-year-old lady with cardiovascular history as described below. She was last seen in the office in 3/2021. She fell on 10/27/2021, which resulted in a displaced transverse left patellar fracture and underwent open reduction with internal fixation, left patella, on 10/29/2021. She, since then, has also been diagnosed with dementia and now resides in assisted living. She was noted to have bilateral lower extremity swelling without SOB and was started on Lasix by her PCP. She is also on hydrochlorothiazide. Her daughter, who is with her today, tells me that her PCP, Dr. Nikhil Dean, checked her electrolytes after starting the Lasix around a month and a half ago and they were normal.  According to her daughter, Paresh Justice walks around Assisted Living with a walker with wheels without any shortness of breath. She also denies chest pain. She denies orthopnea, PND's, the subjective feeling of palpitations, dizziness, presyncope or syncope. REVIEW OF SYSTEMS:    CONSTITUTIONAL:  Denies fevers, chills, night sweats or fatigue. HEENT:  Denies any unusual headaches.  Denies recent changes in hearing or vision.  Denies dysphagia, hoarseness, hemoptysis, hematemesis or epistaxis. ENDOCRINE:  Denies polyphagia, polydipsia or polyuria.  Denies heat or cold intolerance. MUSCULOSKELETAL:  She has chronic low back pain. SKIN:  Denies rashes, ulcers or itching. HEME/LYMPH:  Denies any palpable lymph nodes. She denies bleeding, but reports easy bruisability.    HEART:  As above. LUNGS:  Denies cough or sputum production.   GI: Denies abdominal pain, nausea, vomiting, diarrhea, constipation, rectal bleeding or tarry stools. She does have acid reflux symptoms. :  Denies hematuria or dysuria. PSYCHIATRIC:  Denies mood changes, anxiety or depression. NEUROLOGIC: Peter Almonte has dementia. Denies  motor weakness, numbness, tingling or tremors.        CARDIOVASCULAR HISTORY:    1.  Tobacco abuse:  Patient smoked 1/4 PPD x50 years:  Quit 2017.  2.  Hypertension. 3.  Hyperlipidemia. 4.  Severe three-vessel coronary artery disease:  a.  NSTEMI 2017.  b. Sharl Pih catheterization 2017:  Severe calcific three-vessel CAD.  Systemic HTN.  Normal left ventricular size and systolic function.  Elevated LVEDP.    5.  Bilateral carotid stenosis:  a.  Carotid ultrasound done on 05/15/2017 reportedly showered 60-79% stenosis on the right and 0-40% stenosis on the left. 6.  Atrial fibrillation noted during the office visit on 2019, date of onset unclear.       PAST MEDICAL HISTORY:  1.  As under cardiovascular history. 2.  Osteoarthritis of the lower back. 3.  History of chronic left shoulder pain. 4.  Status post appendectomy. 5.  Status post parotidectomy. 6.  Dementia. 7.  S/P open reduction with internal fixation, left patella, 10/29/2020, S/P fall, 10/27/2021 resulting in the fracture.       FAMILY HISTORY:  Mother  at age 64 from a \"massive heart attack. \" Nydia Goodman  of prostate cancer.  One sister  of liver cirrhosis secondary to alcohol abuse.  One brother  in his 47's from heart disease.     SOCIAL HISTORY:  Smoked 1/4 PPD x50 years, quit 2017. Peter Almonte denies alcohol or illicit drug use. O:  COMPLETE PHYSICAL EXAM:      /60 (Site: Left Upper Arm, Position: Sitting, Cuff Size: Medium Adult)   Pulse 73   Resp 18   Ht 5' 2\" (1.575 m)   Wt 157 lb 9.6 oz (71.5 kg)   SpO2 98%   BMI 28.83 kg/m²      General:          Well-developed, well-nourished, elderly lady in no acute distress. HEENT:           Normocephalic and atraumatic head. Bilateral carotid bruits heard.  Carotid upstrokes normal bilaterally.  No thyromegaly.    Sclerae not icteric.  No xanthelasmas.  Mucous membranes moist.  Chest:              Symmetrical and nontender.  No deformities. Lungs:             Clear to auscultation bilaterally. Heart:              Irregularly irregular.  Normal S1 and S2.  No S3.  No murmurs or rubs. Abdomen:        Soft, nontender without organomegaly or masses.  No bruits.  Normal bowel sounds. Extremities:     1+ [pitting peritibial and ankle edema noted bilaterally. Varicosities normal.  Pulses normal.  Skin:                Normal turgor. No rashes or ulcers noted. Neurologic:      Oriented x3.  No motor or sensory deficits detected.        REVIEW OF DIAGNOSTIC TESTS:    1. EKG done today showed atrial fibrillation with a ventricular response rate of 73 bpm with septal infarct pattern with diffuse T wave abnormality: Unchanged. ASSESSMENT / DIAGNOSIS:   1.  Severe calcific three-vessel coronary artery disease.  Stable with no angina since adding Ranexa.     2.  Bilateral carotid disease. 3.  Permanent atrial fibrillation with controlled ventricular response, anticoagulated on Xarelto.    4.  Hypertension:  Adequately controlled. 5.  Hyperlipidemia:  On statin therapy. 6.  History of sinus bradycardia on low dose beta blocker therapy.   7.  Osteoarthritis of the lower back with chronic low back pain. 8.  Chronic left shoulder pain. 9.  History of tobacco abuse. 10. S/P open reduction with internal fixation, left patella, 10/29/2021 (S/P fall/fracture, 10/27/2021). 11.  Dementia. TREATMENT PLAN:  1. Reassure. 2.  Continue current medications. 3.  Check BMP: Consider potassium supplementation. 4.  Follow up with Cardiology in 1 year or on a prn basis. Rachel Aldridge.  [place] [address]  [phone]

## 2022-02-18 ENCOUNTER — HOSPITAL ENCOUNTER (EMERGENCY)
Age: 82
Discharge: HOME | End: 2022-02-18
Attending: EMERGENCY MEDICINE
Payer: MEDICARE

## 2022-02-18 ENCOUNTER — APPOINTMENT (OUTPATIENT)
Dept: CT IMAGING | Age: 82
End: 2022-02-18
Payer: MEDICARE

## 2022-02-18 VITALS
TEMPERATURE: 98 F | RESPIRATION RATE: 16 BRPM | HEART RATE: 75 BPM | DIASTOLIC BLOOD PRESSURE: 63 MMHG | SYSTOLIC BLOOD PRESSURE: 136 MMHG | OXYGEN SATURATION: 95 %

## 2022-02-18 DIAGNOSIS — S09.90XA INJURY OF HEAD, INITIAL ENCOUNTER: Primary | ICD-10-CM

## 2022-02-18 DIAGNOSIS — Z23 TETANUS TOXOID VACCINATION ADMINISTERED AT CURRENT VISIT: ICD-10-CM

## 2022-02-18 PROCEDURE — 99283 EMERGENCY DEPT VISIT LOW MDM: CPT

## 2022-02-18 PROCEDURE — 70450 CT HEAD/BRAIN W/O DYE: CPT

## 2022-02-18 PROCEDURE — 72125 CT NECK SPINE W/O DYE: CPT

## 2022-02-18 PROCEDURE — 90714 TD VACC NO PRESV 7 YRS+ IM: CPT | Performed by: EMERGENCY MEDICINE

## 2022-02-18 PROCEDURE — 6360000002 HC RX W HCPCS: Performed by: EMERGENCY MEDICINE

## 2022-02-18 PROCEDURE — 90471 IMMUNIZATION ADMIN: CPT | Performed by: EMERGENCY MEDICINE

## 2022-02-18 RX ADMIN — CLOSTRIDIUM TETANI TOXOID ANTIGEN (FORMALDEHYDE INACTIVATED) AND CORYNEBACTERIUM DIPHTHERIAE TOXOID ANTIGEN (FORMALDEHYDE INACTIVATED) 0.5 ML: 5; 2 INJECTION, SUSPENSION INTRAMUSCULAR at 16:54

## 2022-02-18 NOTE — ED NOTES
PT PREPARED FOR DC. PT AND FAMILY VERBALIZED UNDERSTANDING OF DC INSTRUCTIONS. PT SHOWING NO SIGNS OF DISTRESS AT TIME OF DC.  PT WHEELED OUT IN [facility]  02/18/22 [id number]

## 2022-02-18 NOTE — ED NOTES
Bed: 11  Expected date:   Expected time:   Means of arrival:   Comments:  EMS     Kavin Donato.  Shelley Michelle RN  02/18/22 [id number]

## 2022-02-18 NOTE — ED PROVIDER NOTES
HPI:  2/18/22, Time: 4:34 PM LEIDY Du is a 78 y.o. female presenting to the ED for fall that occurred in the nursing home in which patient was walking back to her room with her walker fell backwards in the elevator, beginning 2 hours ago. The complaint has been persistent, mild in severity, and worsened by nothing. Patient ambulate after the event. She states that she usually walks with a walker and got up and kept walking. She states they noticed the bump on the back of her head and sent her to the ER to be evaluated. She does take Xarelto for anticoagulant coagulation due to having atrial fibrillation. Patient mitts to pain on the back of her head denies any other abnormalities or complaints. .     Patient denies fever/chills, sore throat, cough, congestion, chest pain, shortness of breath, edema, headache, visual disturbances, focal paresthesias, focal weakness, abdominal pain, nausea, vomiting, diarrhea, constipation, dysuria, hematuria, neck or back pain, rash or other complaints. ROS:   A complete review of systems was performed and all pertinent positives and negatives are stated within HPI, all other systems reviewed and are negative.            --------------------------------------------- PAST HISTORY ---------------------------------------------  Past Medical History:  has a past medical history of Atrial fibrillation ([facility] [address] Coronary artery disease involving native coronary artery of native heart without angina pectoris, Hyperlipidemia, Hypertension, NSTEMI (non-ST elevated myocardial infarction) ([facility] [address] Osteoarthritis, and Shoulder pain. Past Surgical History:  has a past surgical history that includes Appendectomy; Parotidectomy; Cardiac catheterization (11/20/2017); and Patella fracture surgery (Left, 10/29/2021). Social History:  reports that she quit smoking about 4 years ago. Her smoking use included cigarettes. She has a 42.75 pack-year smoking history.  She has never used smokeless tobacco. She reports previous alcohol use. She reports that she does not use drugs. Family History: family history includes Alcohol Abuse in her brother, father, and sister; Early Death in her mother and sister; Heart Disease in her brother and mother; Prostate Cancer in her father. The patients home medications have been reviewed. Allergies: Other and Sulfa antibiotics        ----------------------------------------PHYSICAL EXAM--------------------------------------    Constitutional:  Well developed, , no acute distress, non-toxic appearance   Eyes:  PERRL, conjunctiva normal, EOMI  HENT:   Hematoma to posterior aspect of the scalp small amount of bleeding. , external ears normal, nose normal, oropharynx moist, no pharyngeal exudates. Neck- normal range of motion, no nuchal rigidity   Respiratory:  No respiratory distress, normal breath sounds, no rales, no wheezing   Cardiovascular:  Normal rate, normal rhythm, no murmurs, no gallops, no rubs. Radial and DP pulses 2+ bilaterally. GI:  Soft, nondistended, normal bowel sounds, nontender, no organomegaly, no mass, no rebound, no guarding   :  No costovertebral angle tenderness   Musculoskeletal:  No edema, no tenderness, no deformities. Back- no tenderness  Integument:  Well hydrated, no rash. Adequate perfusion. Lymphatic:  No cervical lymphadenopathy noted   Neurologic:  Alert & oriented x 3, CN 2-12 normal, normal motor function, normal sensory function, no focal deficits noted. Ambulates with walker was able ambulate in the emergency department. Psychiatric:  Speech and behavior appropriate       -------------------------------------------------- RESULTS -------------------------------------------------  I have personally reviewed all laboratory and imaging results for this patient. Results are listed below. LABS:  No results found for this visit on 02/18/22.     RADIOLOGY:  Interpreted by Radiologist.  Mercy Garcia Contrast   Final Result   1. There is no acute intracranial abnormality. Specifically, there is no   intracranial hemorrhage. 2. Atrophy and periventricular leukomalacia,   3. Large left posterior scalp contusion/hematoma. CT Cervical Spine WO Contrast   Final Result   1. There is no acute compression fracture or subluxation of the cervical   spine. 2. Multilevel degenerative disc and degenerative joint disease.                     ------------------------- NURSING NOTES AND VITALS REVIEWED ---------------------------  The nursing notes within the ED encounter and vital signs as below have been reviewed by myself. /63   Pulse 75   Temp 98 °F (36.7 °C)   Resp 16   SpO2 95%   Oxygen Saturation Interpretation: Normal      The patients available past medical records and past encounters were reviewed. ------------------------------ ED COURSE/MEDICAL DECISION MAKING----------------------  Medications   tetanus & diphtheria toxoids (adult) 5-2 LFU injection 0.5 mL (has no administration in time range)          MEDICATIONS  New Prescriptions    No medications on file             Medical Decision Making:    Patient is 78-year-old female presented emergency department due to a fall. Patient is on his Xarelto CT of the head and neck did not find any acute change identified chronic changes. Patient denies any nausea, vomiting, diarrhea, loss conscious, ongoing pain. I was well-appearing CT imaging was negative. Patient discharged to follow-up with PCP. Patient was explicitly instructed on specific signs and symptoms on which to return to the emergency room for. Patient was instructed to return to the ER for any new or worsening symptoms. Additional discharge instructions were given verbally. All questions were answered. Patient is comfortable and agreeable with discharge plan. Patient in no acute distress and non-toxic in appearance.        This patient's ED course included: a personal history and physicial examination, re-evaluation prior to disposition, multiple bedside re-evaluations, cardiac monitoring, continuous pulse oximetry, complex medical decision making and emergency management and a personal history and physicial eaxmination    This patient has remained hemodynamically stable and been closely monitored during their ED course. Re-Evaluations:  Time: 6729   Re-evaluation. Patients symptoms show no change  Repeat physical examination is not changed          Counseling: The emergency provider has spoken with the patient and discussed todays results, in addition to providing specific details for the plan of care and counseling regarding the diagnosis and prognosis. Questions are answered at this time and they are agreeable with the plan.    --------------------------- IMPRESSION AND DISPOSITION ---------------------------------    IMPRESSION  1. Injury of head, initial encounter    2.  Tetanus toxoid vaccination administered at current visit        DISPOSITION  Disposition: Discharge to nursing home  Patient condition is stable             Cici San DO  Resident  02/18/22 8844

## 2022-02-23 ENCOUNTER — TELEPHONE (OUTPATIENT)
Dept: CARDIOLOGY CLINIC | Age: 82
End: 2022-02-23

## 2022-02-23 DIAGNOSIS — I48.21 ATRIAL FIBRILLATION, PERMANENT (HCC): ICD-10-CM

## 2022-02-23 DIAGNOSIS — I25.10 CORONARY ARTERY DISEASE INVOLVING NATIVE CORONARY ARTERY OF NATIVE HEART WITHOUT ANGINA PECTORIS: Primary | ICD-10-CM

## 2022-02-23 DIAGNOSIS — I25.2 HISTORY OF NON-ST ELEVATION MYOCARDIAL INFARCTION (NSTEMI): ICD-10-CM

## 2022-02-23 DIAGNOSIS — Z79.01 ANTICOAGULATED BY ANTICOAGULATION TREATMENT: ICD-10-CM

## 2022-02-23 DIAGNOSIS — I65.23 BILATERAL CAROTID ARTERY STENOSIS: ICD-10-CM

## 2022-02-23 RX ORDER — FUROSEMIDE 20 MG/1
40 TABLET ORAL DAILY
Qty: 60 TABLET | Refills: 1 | Status: CANCELLED | OUTPATIENT
Start: 2022-02-23

## 2022-02-23 NOTE — TELEPHONE ENCOUNTER
Patient's daughter called. Pt resides at [facility] at [facility]. Just saw you about 2 weeks ago. Legs are more swollen and the skin is splitting and peeling. Was wondering if her Lasix can be increased or add another medication to help get rid of the swelling in her legs. Already takes 25 mg of Lasix and in her BP pill, there is a diuretic also. But doesn't seem to be helping to reduce the swelling.

## 2022-02-23 NOTE — TELEPHONE ENCOUNTER
Patient's daughter called back. Needed order for blood work faxed to Manny Belcher at [facility]. Hilma Gaucher had talked to her regarding this. Referred call to Hilma Gaucher.

## 2022-03-07 ENCOUNTER — TELEPHONE (OUTPATIENT)
Dept: CARDIOLOGY CLINIC | Age: 82
End: 2022-03-07

## 2022-03-07 NOTE — TELEPHONE ENCOUNTER
Dtr Makenzie Murphy called to report that Nitro (just 1st dose) has been used x2 in the last week at [facility] with relief. Per Makenzie Murphy, pt is a DNR and dtr is trying to avoid hospitalization. She is seeking to make her mother more comfortable. Pt is currently taking Imdur 60 mg. Dtr is inquiring as to whether Imdur dosing could be increased as it is what has kept her mother comfortable in the past.     Please advise.      Tamanna Aldridge RN

## 2022-03-07 NOTE — TELEPHONE ENCOUNTER
Dtr called back to report she had reviewed her own notes re: her mother's medications. It was not the Imdur she was referiring to but rather the Ranexa. Pt is on 500mg BID. Dtr is wondering if we are at max on this med or is it possible to increase it?

## 2022-03-08 RX ORDER — RANOLAZINE 1000 MG/1
1000 TABLET, EXTENDED RELEASE ORAL 2 TIMES DAILY
Qty: 60 TABLET | Refills: 5 | OUTPATIENT
Start: 2022-03-08 | End: 2022-03-09 | Stop reason: DRUGHIGH

## 2022-03-08 NOTE — TELEPHONE ENCOUNTER
Called dtr Zoie Page to inform her of increase in Ranexa to 1000 mg daily. That is the max dose and patient will need to use Nitro for chest pain if it occurs again. Dr. Jazmine Hurd coordinator is calling the intermediate and pharmacy with the dosage change.      Yobany Lopez RN

## 2022-03-09 ENCOUNTER — TELEPHONE (OUTPATIENT)
Dept: CARDIOLOGY CLINIC | Age: 82
End: 2022-03-09

## 2022-03-09 RX ORDER — RANOLAZINE 500 MG/1
500 TABLET, EXTENDED RELEASE ORAL 2 TIMES DAILY
Qty: 60 TABLET | Refills: 5
Start: 2022-03-09

## 2022-03-09 NOTE — TELEPHONE ENCOUNTER
RADHA CALLED REGARDING RAISING THE RANEXA TO 1000 MG BID. SHE DOES NOT FEEL THAT HER MOTHER NEEDS THE INCREASED DOSE AT THIS TIME. PER DR LEE  GO BACK TO  MG. BID. I CALLED THE INN AT [address] AND GAVE THEM THE ORDER TO RESUME RANEXA 500 MG BID. I CALLED AND TOLD RADHA THAT THE ORDER WAS CHANGED AND TO CALL IF THERE WAS ANYTHING SHE NEEDED.   RLL

## 2022-04-25 ENCOUNTER — TELEPHONE (OUTPATIENT)
Dept: CARDIOLOGY CLINIC | Age: 82
End: 2022-04-25

## 2022-04-25 NOTE — TELEPHONE ENCOUNTER
Daughter Radha Parada called about her mother's swelling. Legs are no better, maybe a little worse. Not infected.   Questioning what to do.  [phone]

## 2022-04-26 NOTE — TELEPHONE ENCOUNTER
SPOKE WITH RADHA AND GAVE HER THE NEW LASIX ORDER. SHE ASKED ME TO CALL THE ORDER INTO THE INN AT [address]. I CALLED THE INN AT [address] AND SPOKE TO ROBERT. I TOLD HER THAT PAU WAS TO TAKE HER LASIX 40 BID FOR ONE WEEK, THEN RESUME LASIX 40 QD. SHE IS TO HAVE A BMP AND A BNP IN ONE WEEK.   RLL

## 2022-04-29 ENCOUNTER — TELEPHONE (OUTPATIENT)
Dept: CARDIOLOGY CLINIC | Age: 82
End: 2022-04-29

## 2022-04-29 NOTE — TELEPHONE ENCOUNTER
FRANCINE FROM THE [facility] AT [place] CALLED. PAU WAS SUPPOSED TO INCREASE HER LASIX TO 40 MG BID FOR ONE WEEK THEN RECHECK BMP AND BNP, AND RESUME LASIX 40 MG QD.  SHE IS HAVING EMESIS AND DIARRHEA CAN THEY HOLD THE LASIX AND GET LABS TODAY. PER DR LEE   YES THE LASIX CAN BE HELD AND LABS CAN BE DONE TODAY.       LEFT MESSAGE FOR NURSE AT THE [facility] TO CALL ME. RLL

## 2022-05-09 ENCOUNTER — TELEPHONE (OUTPATIENT)
Dept: CARDIOLOGY CLINIC | Age: 82
End: 2022-05-09

## 2022-05-09 NOTE — TELEPHONE ENCOUNTER
FRANCINE FROM THE [facility] AT [place] CALLED. KALPANA WATTS'S  BNP WAS 2295. SHE IS ON LASIX 40 MG, NO COUGH, NO SOB, NO EDEMA. PER DR LEE  CONTINUE THE SAME .      I CALLED AND GAVE THE ABOVE INFORMATION TO FRANCINE GONGORA

## 2022-07-01 ENCOUNTER — HOSPITAL ENCOUNTER (EMERGENCY)
Age: 82
Discharge: NURSING, REHAB OR LONG-TERM CARE | End: 2022-07-01
Payer: MEDICARE

## 2022-07-01 ENCOUNTER — APPOINTMENT (OUTPATIENT)
Dept: GENERAL RADIOLOGY | Age: 82
End: 2022-07-01
Payer: MEDICARE

## 2022-07-01 ENCOUNTER — APPOINTMENT (OUTPATIENT)
Dept: CT IMAGING | Age: 82
End: 2022-07-01
Payer: MEDICARE

## 2022-07-01 VITALS
HEART RATE: 80 BPM | SYSTOLIC BLOOD PRESSURE: 132 MMHG | TEMPERATURE: 97.6 F | RESPIRATION RATE: 18 BRPM | DIASTOLIC BLOOD PRESSURE: 67 MMHG | OXYGEN SATURATION: 95 %

## 2022-07-01 DIAGNOSIS — S80.02XA CONTUSION OF LEFT KNEE, INITIAL ENCOUNTER: Primary | ICD-10-CM
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
## 2022-07-01 DIAGNOSIS — S00.83XA FOREHEAD CONTUSION, INITIAL ENCOUNTER: ICD-10-CM

## 2022-07-01 PROCEDURE — 73564 X-RAY EXAM KNEE 4 OR MORE: CPT

## 2022-07-01 PROCEDURE — 99283 EMERGENCY DEPT VISIT LOW MDM: CPT

## 2022-07-01 RX ORDER — ONDANSETRON 4 MG/1
TABLET, FILM COATED ORAL
COMMUNITY
Start: 2022-04-26

## 2022-07-01 ASSESSMENT — PAIN - FUNCTIONAL ASSESSMENT: PAIN_FUNCTIONAL_ASSESSMENT: NONE - DENIES PAIN

## 2022-07-01 NOTE — ED NOTES
Pt daughters requesting to not have head CT at this time. Richelle RUSSELL called to bedside.       Lanie Shepherd RN  07/01/22 0048

## 2022-07-01 NOTE — ED NOTES
Attempted to call pt SNF with no answer. Message left with call back information.       Kala Barragan RN  07/01/22 6579

## 2022-07-01 NOTE — ED PROVIDER NOTES
[facility]  Department of Emergency Medicine   ED  Encounter Note  Admit Date/RoomTime: 2022  2:05 AM  ED Room:     NAME: Vanessa Lemus  : 1940  MRN: [id number]     Chief Complaint:  Fall (Pt presents to ED via EMS after mechanical fall after \"tripping on rug\". Pt has small hematoma on left temple. Pt takes baby ASA and Xarelto. Denies any LOC, dizziness or syncope. ) and Knee Pain (left)    History of Present Illness       Vanessa Lemus is a 78 y.o. old female who presents to the emergency department by private vehicle, for a mechanical fall which occured 1 hour(s) prior to arrival. She reportedly was getting up from a chair to go to bed when she tripped on the rug in front of her and fell forward onto knees and evidently hit head While at home prior to incident with complaints of left knee swelling. Patient had a patellar fracture in 2021 which required surgical repair. This is the same knee which is injured now. .    The patients tetanus status is up to date. Since onset the symptoms have been persistent. Her pain is aggraveated by bending the knee putting pressure on the medial aspect of the patella. And relieved by nothing, as no treatment has been provided prior to this visit. She denies any loss of consciousness, confusion, neck pain, chest pain, abdominal pain or back pain. She takes Xarelto. .  ROS   Pertinent positives and negatives are stated within HPI, all other systems reviewed and are negative. Past Medical History:  has a past medical history of Atrial fibrillation ([address] Coronary artery disease involving native coronary artery of native heart without angina pectoris, Hyperlipidemia, Hypertension, NSTEMI (non-ST elevated myocardial infarction) ([address] Osteoarthritis, and Shoulder pain.     Surgical History:  has a past surgical history that includes Appendectomy; Parotidectomy; Cardiac catheterization (2017); and Patella fracture surgery (Left, 10/29/2021). Social History:  reports that she quit smoking about 4 years ago. Her smoking use included cigarettes. She has a 42.75 pack-year smoking history. She has never used smokeless tobacco. She reports previous alcohol use. She reports that she does not use drugs. Family History: family history includes Alcohol Abuse in her brother, father, and sister; Early Death in her mother and sister; Heart Disease in her brother and mother; Prostate Cancer in her father. Allergies: Other and Sulfa antibiotics    Physical Exam   Oxygen Saturation Interpretation: Normal.        ED Triage Vitals [07/01/22 0212]   BP Temp Temp Source Heart Rate Resp SpO2 Height Weight   132/67 97.6 °F (36.4 °C) Oral 80 18 95 % -- --         Physical Exam  Constitutional:  Alert, development consistent with age. HEENT:  NC/NT. Airway patent. Small hematoma on the left lateral superior orbit, extraocular motions intact and painless. Pupils equal round and reactive to light and accommodation. Hematoma area nontender to palpation. Tympanic membranes normal in appearance bilaterally. No malocclusion of the jaw. Posterior pharynx unremarkable. Neck:  No midline or paravertebral tenderness. Normal ROM. Supple. Chest:  Symmetrical without visible rash or tenderness. Respiratory:  Lungs Clear to auscultation and breath sounds equal.  CV:  Regular rate and rhythm, normal heart sounds, without pathological murmurs, ectopy, gallops, or rubs. GI:  Abdomen Soft, nontender, good bowel sounds. No firm or pulsatile mass. Pelvis:  Stable, nontender to palpation. Full range of motion bilateral hips without pain. Back:  No midline or paravertebral tenderness. No costovertebral tenderness. Extremities: Bruising and swelling over the left patella, mild. Focal tenderness on the medial aspect of the left patella and lateral joint line of the left knee.   Bilateral pitting peripheral edema, contusion, initial encounter    3. Fall at home, initial encounter      Plan   Discharged home. Patient condition is good    New Medications     New Prescriptions    No medications on file     Electronically signed by Lee Tapia PA-C   DD: 7/1/22  **This report was transcribed using voice recognition software. Every effort was made to ensure accuracy; however, inadvertent computerized transcription errors may be present.   END OF ED PROVIDER NOTE       Lee Tapia PA-C  07/01/22 1482

## 2023-04-17 ENCOUNTER — HOSPITAL ENCOUNTER (INPATIENT)
Age: 83
LOS: 1 days | Discharge: HOME | DRG: 190 | End: 2023-04-18
Attending: EMERGENCY MEDICINE | Admitting: FAMILY MEDICINE
Payer: MEDICAID

## 2023-04-17 ENCOUNTER — APPOINTMENT (OUTPATIENT)
Dept: GENERAL RADIOLOGY | Age: 83
DRG: 190 | End: 2023-04-17
Payer: MEDICAID

## 2023-04-17 DIAGNOSIS — I24.9 ACUTE CORONARY SYNDROME (HCC): ICD-10-CM

## 2023-04-17 DIAGNOSIS — I21.3 ST ELEVATION MYOCARDIAL INFARCTION (STEMI), UNSPECIFIED ARTERY (HCC): Primary | ICD-10-CM

## 2023-04-17 LAB
ALBUMIN SERPL-MCNC: 4.1 G/DL (ref 3.5–5.2)
ALP SERPL-CCNC: 85 U/L (ref 35–104)
ALT SERPL-CCNC: 6 U/L (ref 0–32)
ANION GAP SERPL CALCULATED.3IONS-SCNC: 19 MMOL/L (ref 7–16)
APTT BLD: 44.1 SEC (ref 24.5–35.1)
APTT BLD: >240 SEC (ref 24.5–35.1)
AST SERPL-CCNC: 22 U/L (ref 0–31)
BASOPHILS # BLD: 0 E9/L (ref 0–0.2)
BASOPHILS NFR BLD: 0.3 % (ref 0–2)
BILIRUB DIRECT SERPL-MCNC: 0.3 MG/DL (ref 0–0.3)
BILIRUB INDIRECT SERPL-MCNC: 0.5 MG/DL (ref 0–1)
BILIRUB SERPL-MCNC: 0.8 MG/DL (ref 0–1.2)
BNP BLD-MCNC: 4028 PG/ML (ref 0–450)
BUN SERPL-MCNC: 41 MG/DL (ref 6–23)
CALCIUM SERPL-MCNC: 9.7 MG/DL (ref 8.6–10.2)
CHLORIDE SERPL-SCNC: 91 MMOL/L (ref 98–107)
CO2 SERPL-SCNC: 20 MMOL/L (ref 22–29)
CREAT SERPL-MCNC: 1.9 MG/DL (ref 0.5–1)
EKG ATRIAL RATE: 94 BPM
EKG Q-T INTERVAL: 450 MS
EKG QRS DURATION: 84 MS
EKG QTC CALCULATION (BAZETT): 506 MS
EKG R AXIS: 28 DEGREES
EKG T AXIS: -154 DEGREES
EKG VENTRICULAR RATE: 76 BPM
EOSINOPHIL # BLD: 0 E9/L (ref 0.05–0.5)
EOSINOPHIL NFR BLD: 0.1 % (ref 0–6)
ERYTHROCYTE [DISTWIDTH] IN BLOOD BY AUTOMATED COUNT: 14.2 FL (ref 11.5–15)
ERYTHROCYTE [DISTWIDTH] IN BLOOD BY AUTOMATED COUNT: 14.2 FL (ref 11.5–15)
GLUCOSE SERPL-MCNC: 111 MG/DL (ref 74–99)
HCT VFR BLD AUTO: 26.5 % (ref 34–48)
HCT VFR BLD AUTO: 28 % (ref 34–48)
HGB BLD-MCNC: 8.4 G/DL (ref 11.5–15.5)
HGB BLD-MCNC: 8.8 G/DL (ref 11.5–15.5)
INR BLD: 4.5
INR BLD: 5.6
LYMPHOCYTES # BLD: 2.05 E9/L (ref 1.5–4)
LYMPHOCYTES NFR BLD: 15.7 % (ref 20–42)
MCH RBC QN AUTO: 28.8 PG (ref 26–35)
MCH RBC QN AUTO: 28.8 PG (ref 26–35)
MCHC RBC AUTO-ENTMCNC: 31.4 % (ref 32–34.5)
MCHC RBC AUTO-ENTMCNC: 31.7 % (ref 32–34.5)
MCV RBC AUTO: 90.8 FL (ref 80–99.9)
MCV RBC AUTO: 91.5 FL (ref 80–99.9)
MONOCYTES # BLD: 1.41 E9/L (ref 0.1–0.95)
MONOCYTES NFR BLD: 11.3 % (ref 2–12)
NEUTROPHILS # BLD: 9.34 E9/L (ref 1.8–7.3)
NEUTS SEG NFR BLD: 73 % (ref 43–80)
OVALOCYTES: ABNORMAL
PLATELET # BLD AUTO: 271 E9/L (ref 130–450)
PLATELET # BLD AUTO: 284 E9/L (ref 130–450)
PMV BLD AUTO: 10.8 FL (ref 7–12)
PMV BLD AUTO: 10.9 FL (ref 7–12)
POIKILOCYTES: ABNORMAL
POLYCHROMASIA: ABNORMAL
POTASSIUM SERPL-SCNC: 3.8 MMOL/L (ref 3.5–5)
PROT SERPL-MCNC: 6.9 G/DL (ref 6.4–8.3)
PROTHROMBIN TIME: 48.4 SEC (ref 9.3–12.4)
PROTHROMBIN TIME: 60.7 SEC (ref 9.3–12.4)
RBC # BLD AUTO: 2.92 E12/L (ref 3.5–5.5)
RBC # BLD AUTO: 3.06 E12/L (ref 3.5–5.5)
REASON FOR REJECTION: NORMAL
REJECTED TEST: NORMAL
SCHISTOCYTES: ABNORMAL
SODIUM SERPL-SCNC: 130 MMOL/L (ref 132–146)
TROPONIN, HIGH SENSITIVITY: 167 NG/L (ref 0–9)
WBC # BLD: 11 E9/L (ref 4.5–11.5)
WBC # BLD: 12.8 E9/L (ref 4.5–11.5)

## 2023-04-17 PROCEDURE — 1200000000 HC SEMI PRIVATE

## 2023-04-17 PROCEDURE — 2580000003 HC RX 258: Performed by: FAMILY MEDICINE

## 2023-04-17 PROCEDURE — 80048 BASIC METABOLIC PNL TOTAL CA: CPT

## 2023-04-17 PROCEDURE — 36415 COLL VENOUS BLD VENIPUNCTURE: CPT

## 2023-04-17 PROCEDURE — 99223 1ST HOSP IP/OBS HIGH 75: CPT | Performed by: INTERNAL MEDICINE

## 2023-04-17 PROCEDURE — 85027 COMPLETE CBC AUTOMATED: CPT

## 2023-04-17 PROCEDURE — 83880 ASSAY OF NATRIURETIC PEPTIDE: CPT

## 2023-04-17 PROCEDURE — 93005 ELECTROCARDIOGRAM TRACING: CPT | Performed by: STUDENT IN AN ORGANIZED HEALTH CARE EDUCATION/TRAINING PROGRAM

## 2023-04-17 PROCEDURE — 93010 ELECTROCARDIOGRAM REPORT: CPT | Performed by: INTERNAL MEDICINE

## 2023-04-17 PROCEDURE — 71045 X-RAY EXAM CHEST 1 VIEW: CPT

## 2023-04-17 PROCEDURE — APPSS45 APP SPLIT SHARED TIME 31-45 MINUTES: Performed by: PHYSICIAN ASSISTANT

## 2023-04-17 PROCEDURE — 6360000002 HC RX W HCPCS: Performed by: EMERGENCY MEDICINE

## 2023-04-17 PROCEDURE — 85610 PROTHROMBIN TIME: CPT

## 2023-04-17 PROCEDURE — 6370000000 HC RX 637 (ALT 250 FOR IP): Performed by: INTERNAL MEDICINE

## 2023-04-17 PROCEDURE — 93005 ELECTROCARDIOGRAM TRACING: CPT | Performed by: EMERGENCY MEDICINE

## 2023-04-17 PROCEDURE — 84484 ASSAY OF TROPONIN QUANT: CPT

## 2023-04-17 PROCEDURE — 85730 THROMBOPLASTIN TIME PARTIAL: CPT

## 2023-04-17 PROCEDURE — 99285 EMERGENCY DEPT VISIT HI MDM: CPT

## 2023-04-17 PROCEDURE — 6370000000 HC RX 637 (ALT 250 FOR IP): Performed by: STUDENT IN AN ORGANIZED HEALTH CARE EDUCATION/TRAINING PROGRAM

## 2023-04-17 PROCEDURE — 85025 COMPLETE CBC W/AUTO DIFF WBC: CPT

## 2023-04-17 PROCEDURE — 80076 HEPATIC FUNCTION PANEL: CPT

## 2023-04-17 RX ORDER — TRAMADOL HYDROCHLORIDE 50 MG/1
50 TABLET ORAL DAILY PRN
Status: DISCONTINUED | OUTPATIENT
Start: 2023-04-17 | End: 2023-04-18 | Stop reason: HOSPADM

## 2023-04-17 RX ORDER — ASPIRIN 81 MG/1
243 TABLET, CHEWABLE ORAL ONCE
Status: COMPLETED | OUTPATIENT
Start: 2023-04-17 | End: 2023-04-17

## 2023-04-17 RX ORDER — CLOPIDOGREL BISULFATE 75 MG/1
75 TABLET ORAL DAILY
Status: DISCONTINUED | OUTPATIENT
Start: 2023-04-17 | End: 2023-04-18 | Stop reason: HOSPADM

## 2023-04-17 RX ORDER — FUROSEMIDE 20 MG/1
40 TABLET ORAL DAILY
Status: DISCONTINUED | OUTPATIENT
Start: 2023-04-17 | End: 2023-04-18

## 2023-04-17 RX ORDER — FAMOTIDINE 20 MG/1
20 TABLET, FILM COATED ORAL DAILY PRN
Status: CANCELLED | OUTPATIENT
Start: 2023-04-17

## 2023-04-17 RX ORDER — POLYETHYLENE GLYCOL 3350 17 G/17G
17 POWDER, FOR SOLUTION ORAL DAILY PRN
Status: DISCONTINUED | OUTPATIENT
Start: 2023-04-17 | End: 2023-04-18 | Stop reason: HOSPADM

## 2023-04-17 RX ORDER — NITROGLYCERIN 0.4 MG/1
0.4 TABLET SUBLINGUAL EVERY 5 MIN PRN
Status: DISCONTINUED | OUTPATIENT
Start: 2023-04-17 | End: 2023-04-18 | Stop reason: HOSPADM

## 2023-04-17 RX ORDER — ACETAMINOPHEN 650 MG/1
650 SUPPOSITORY RECTAL EVERY 6 HOURS PRN
Status: DISCONTINUED | OUTPATIENT
Start: 2023-04-17 | End: 2023-04-18 | Stop reason: HOSPADM

## 2023-04-17 RX ORDER — FUROSEMIDE 20 MG/1
40 TABLET ORAL DAILY
Status: DISCONTINUED | OUTPATIENT
Start: 2023-04-17 | End: 2023-04-18 | Stop reason: HOSPADM

## 2023-04-17 RX ORDER — CETIRIZINE HYDROCHLORIDE 10 MG/1
10 TABLET ORAL DAILY
Status: DISCONTINUED | OUTPATIENT
Start: 2023-04-17 | End: 2023-04-18 | Stop reason: HOSPADM

## 2023-04-17 RX ORDER — AMLODIPINE BESYLATE 5 MG/1
5 TABLET ORAL DAILY
COMMUNITY

## 2023-04-17 RX ORDER — VALSARTAN 80 MG/1
80 TABLET ORAL DAILY
Status: DISCONTINUED | OUTPATIENT
Start: 2023-04-17 | End: 2023-04-17 | Stop reason: ALTCHOICE

## 2023-04-17 RX ORDER — FAMOTIDINE 20 MG/1
20 TABLET, FILM COATED ORAL DAILY PRN
COMMUNITY

## 2023-04-17 RX ORDER — HEPARIN SODIUM 10000 [USP'U]/100ML
5-30 INJECTION, SOLUTION INTRAVENOUS CONTINUOUS
Status: DISCONTINUED | OUTPATIENT
Start: 2023-04-17 | End: 2023-04-18

## 2023-04-17 RX ORDER — HEPARIN SODIUM 1000 [USP'U]/ML
4000 INJECTION, SOLUTION INTRAVENOUS; SUBCUTANEOUS PRN
Status: DISCONTINUED | OUTPATIENT
Start: 2023-04-17 | End: 2023-04-18

## 2023-04-17 RX ORDER — AMLODIPINE BESYLATE 5 MG/1
1 TABLET ORAL DAILY
Status: DISCONTINUED | OUTPATIENT
Start: 2023-04-17 | End: 2023-04-17

## 2023-04-17 RX ORDER — HEPARIN SODIUM 1000 [USP'U]/ML
2000 INJECTION, SOLUTION INTRAVENOUS; SUBCUTANEOUS PRN
Status: DISCONTINUED | OUTPATIENT
Start: 2023-04-17 | End: 2023-04-18

## 2023-04-17 RX ORDER — ISOSORBIDE MONONITRATE 60 MG/1
60 TABLET, EXTENDED RELEASE ORAL DAILY
COMMUNITY

## 2023-04-17 RX ORDER — ASPIRIN 81 MG/1
81 TABLET ORAL EVERY MORNING
Status: DISCONTINUED | OUTPATIENT
Start: 2023-04-18 | End: 2023-04-18

## 2023-04-17 RX ORDER — VALSARTAN AND HYDROCHLOROTHIAZIDE 160; 12.5 MG/1; MG/1
1 TABLET, FILM COATED ORAL EVERY MORNING
Status: DISCONTINUED | OUTPATIENT
Start: 2023-04-18 | End: 2023-04-17 | Stop reason: ALTCHOICE

## 2023-04-17 RX ORDER — ISOSORBIDE MONONITRATE 30 MG/1
60 TABLET, EXTENDED RELEASE ORAL DAILY
Status: DISCONTINUED | OUTPATIENT
Start: 2023-04-17 | End: 2023-04-18 | Stop reason: HOSPADM

## 2023-04-17 RX ORDER — MAGNESIUM HYDROXIDE/ALUMINUM HYDROXICE/SIMETHICONE 120; 1200; 1200 MG/30ML; MG/30ML; MG/30ML
30 SUSPENSION ORAL EVERY 6 HOURS PRN
COMMUNITY

## 2023-04-17 RX ORDER — METOPROLOL SUCCINATE 25 MG/1
100 TABLET, EXTENDED RELEASE ORAL DAILY
Status: DISCONTINUED | OUTPATIENT
Start: 2023-04-17 | End: 2023-04-18 | Stop reason: HOSPADM

## 2023-04-17 RX ORDER — RANOLAZINE 500 MG/1
500 TABLET, EXTENDED RELEASE ORAL 2 TIMES DAILY
Status: DISCONTINUED | OUTPATIENT
Start: 2023-04-17 | End: 2023-04-17

## 2023-04-17 RX ORDER — BISACODYL 5 MG/1
5 TABLET, DELAYED RELEASE ORAL DAILY PRN
COMMUNITY

## 2023-04-17 RX ORDER — SODIUM CHLORIDE 0.9 % (FLUSH) 0.9 %
5-40 SYRINGE (ML) INJECTION EVERY 12 HOURS SCHEDULED
Status: DISCONTINUED | OUTPATIENT
Start: 2023-04-17 | End: 2023-04-18 | Stop reason: HOSPADM

## 2023-04-17 RX ORDER — RANOLAZINE 500 MG/1
1000 TABLET, EXTENDED RELEASE ORAL 2 TIMES DAILY
Status: DISCONTINUED | OUTPATIENT
Start: 2023-04-17 | End: 2023-04-18 | Stop reason: HOSPADM

## 2023-04-17 RX ORDER — ATORVASTATIN CALCIUM 40 MG/1
40 TABLET, FILM COATED ORAL NIGHTLY
Status: DISCONTINUED | OUTPATIENT
Start: 2023-04-17 | End: 2023-04-18 | Stop reason: HOSPADM

## 2023-04-17 RX ORDER — ISOSORBIDE MONONITRATE 60 MG/1
60 TABLET, EXTENDED RELEASE ORAL DAILY
Status: DISCONTINUED | OUTPATIENT
Start: 2023-04-17 | End: 2023-04-18

## 2023-04-17 RX ORDER — ATORVASTATIN CALCIUM 40 MG/1
40 TABLET, FILM COATED ORAL EVERY MORNING
Status: DISCONTINUED | OUTPATIENT
Start: 2023-04-18 | End: 2023-04-17 | Stop reason: SDUPTHER

## 2023-04-17 RX ORDER — NYSTATIN 100000 [USP'U]/G
POWDER TOPICAL 2 TIMES DAILY PRN
COMMUNITY

## 2023-04-17 RX ORDER — GUAIFENESIN AND DEXTROMETHORPHAN HYDROBROMIDE 100; 10 MG/5ML; MG/5ML
30 SOLUTION ORAL EVERY 4 HOURS PRN
COMMUNITY

## 2023-04-17 RX ORDER — HYDROCHLOROTHIAZIDE 12.5 MG/1
12.5 TABLET ORAL EVERY MORNING
Status: DISCONTINUED | OUTPATIENT
Start: 2023-04-18 | End: 2023-04-18

## 2023-04-17 RX ORDER — HEPARIN SODIUM 1000 [USP'U]/ML
4000 INJECTION, SOLUTION INTRAVENOUS; SUBCUTANEOUS ONCE
Status: COMPLETED | OUTPATIENT
Start: 2023-04-17 | End: 2023-04-17

## 2023-04-17 RX ORDER — ACETAMINOPHEN 500 MG
500 TABLET ORAL EVERY 12 HOURS
COMMUNITY

## 2023-04-17 RX ORDER — ONDANSETRON 4 MG/1
4 TABLET, FILM COATED ORAL EVERY 8 HOURS PRN
COMMUNITY

## 2023-04-17 RX ORDER — METOPROLOL SUCCINATE 25 MG/1
100 TABLET, EXTENDED RELEASE ORAL DAILY
Status: DISCONTINUED | OUTPATIENT
Start: 2023-04-17 | End: 2023-04-17

## 2023-04-17 RX ORDER — ONDANSETRON 2 MG/ML
4 INJECTION INTRAMUSCULAR; INTRAVENOUS EVERY 6 HOURS PRN
Status: DISCONTINUED | OUTPATIENT
Start: 2023-04-17 | End: 2023-04-18 | Stop reason: HOSPADM

## 2023-04-17 RX ORDER — AMLODIPINE BESYLATE 5 MG/1
5 TABLET ORAL DAILY
Status: DISCONTINUED | OUTPATIENT
Start: 2023-04-17 | End: 2023-04-18

## 2023-04-17 RX ORDER — SODIUM CHLORIDE 0.9 % (FLUSH) 0.9 %
5-40 SYRINGE (ML) INJECTION PRN
Status: DISCONTINUED | OUTPATIENT
Start: 2023-04-17 | End: 2023-04-18 | Stop reason: HOSPADM

## 2023-04-17 RX ORDER — ACETAMINOPHEN 325 MG/1
650 TABLET ORAL EVERY 6 HOURS PRN
Status: DISCONTINUED | OUTPATIENT
Start: 2023-04-17 | End: 2023-04-18 | Stop reason: HOSPADM

## 2023-04-17 RX ORDER — LOPERAMIDE HYDROCHLORIDE 2 MG/1
2 CAPSULE ORAL 4 TIMES DAILY PRN
COMMUNITY

## 2023-04-17 RX ORDER — ACETAMINOPHEN 325 MG/1
325 TABLET ORAL EVERY 6 HOURS PRN
Status: DISCONTINUED | OUTPATIENT
Start: 2023-04-17 | End: 2023-04-17

## 2023-04-17 RX ORDER — ONDANSETRON 4 MG/1
4 TABLET, ORALLY DISINTEGRATING ORAL EVERY 8 HOURS PRN
Status: DISCONTINUED | OUTPATIENT
Start: 2023-04-17 | End: 2023-04-18 | Stop reason: HOSPADM

## 2023-04-17 RX ORDER — ASPIRIN 81 MG/1
81 TABLET, CHEWABLE ORAL DAILY
Status: DISCONTINUED | OUTPATIENT
Start: 2023-04-17 | End: 2023-04-17

## 2023-04-17 RX ORDER — VALSARTAN 80 MG/1
160 TABLET ORAL EVERY MORNING
Status: DISCONTINUED | OUTPATIENT
Start: 2023-04-18 | End: 2023-04-18

## 2023-04-17 RX ORDER — ESCITALOPRAM OXALATE 10 MG/1
10 TABLET ORAL DAILY
Status: DISCONTINUED | OUTPATIENT
Start: 2023-04-17 | End: 2023-04-18 | Stop reason: HOSPADM

## 2023-04-17 RX ORDER — SODIUM CHLORIDE 9 MG/ML
INJECTION, SOLUTION INTRAVENOUS PRN
Status: DISCONTINUED | OUTPATIENT
Start: 2023-04-17 | End: 2023-04-18 | Stop reason: HOSPADM

## 2023-04-17 RX ADMIN — HEPARIN SODIUM 12 UNITS/KG/HR: 10000 INJECTION, SOLUTION INTRAVENOUS at 11:15

## 2023-04-17 RX ADMIN — RANOLAZINE 1000 MG: 500 TABLET, EXTENDED RELEASE ORAL at 21:46

## 2023-04-17 RX ADMIN — Medication 10 ML: at 11:58

## 2023-04-17 RX ADMIN — ASPIRIN 81 MG CHEWABLE TABLET 243 MG: 81 TABLET CHEWABLE at 10:07

## 2023-04-17 RX ADMIN — HEPARIN SODIUM 4000 UNITS: 1000 INJECTION INTRAVENOUS; SUBCUTANEOUS at 11:12

## 2023-04-17 RX ADMIN — ATORVASTATIN CALCIUM 40 MG: 40 TABLET, FILM COATED ORAL at 21:46

## 2023-04-17 RX ADMIN — Medication 10 ML: at 21:40

## 2023-04-17 ASSESSMENT — PAIN - FUNCTIONAL ASSESSMENT
PAIN_FUNCTIONAL_ASSESSMENT: NONE - DENIES PAIN
PAIN_FUNCTIONAL_ASSESSMENT: NONE - DENIES PAIN

## 2023-04-17 NOTE — ED NOTES
Lab called to inform that PT-inr and aptt were hemolyzed.   New blue top was drawn and sent to lab     Natalia Guan RN  04/17/23 [id number]

## 2023-04-17 NOTE — ED PROVIDER NOTES
PAROTIDECTOMY      PATELLA FRACTURE SURGERY Left 10/29/2021    OPEN REDUCTION INTERNAL FIXATION LEFT PATELLA performed by Eldon Membreno MD at [address]       Previous Medications    ACETAMINOPHEN (TYLENOL) 325 MG TABLET    Take 325 mg by mouth every 6 hours as needed for Pain     AMLODIPINE (NORVASC) 5 MG TABLET    TAKE ONE TABLET BY MOUTH EVERY DAY    ASPIRIN EC 81 MG EC TABLET    Take 81 mg by mouth every morning     ATORVASTATIN (LIPITOR) 40 MG TABLET    Take 1 tablet by mouth every morning    ESCITALOPRAM (LEXAPRO) 10 MG TABLET    Take 10 mg by mouth daily    FAMOTIDINE (PEPCID AC PO)    Take 20 mg by mouth daily as needed    FUROSEMIDE (LASIX) 20 MG TABLET    Take 40 mg by mouth daily     ISOSORBIDE MONONITRATE (IMDUR) 60 MG EXTENDED RELEASE TABLET    TAKE ONE TABLET BY MOUTH EVERY DAY    LORATADINE (CLARITIN) 10 MG CAPSULE    Take 10 mg by mouth daily    MENTHOL, TOPICAL ANALGESIC, (BIOFREEZE ROLL-ON EX)    Apply topically as needed     METOPROLOL SUCCINATE (TOPROL XL) 100 MG EXTENDED RELEASE TABLET    Take 1 tablet by mouth daily    NITROGLYCERIN (NITROSTAT) 0.4 MG SL TABLET    Place 1 tablet under the tongue every 5 minutes as needed for Chest pain    ONDANSETRON (ZOFRAN) 4 MG TABLET        RANOLAZINE (RANEXA) 500 MG EXTENDED RELEASE TABLET    Take 1 tablet by mouth 2 times daily    RIVAROXABAN (XARELTO) 20 MG TABS TABLET    Take 1 tablet by mouth daily (with breakfast)    TRAMADOL (ULTRAM) 50 MG TABLET    Take 50 mg by mouth daily as needed.      VALSARTAN-HYDROCHLOROTHIAZIDE (DIOVAN-HCT) 160-12.5 MG PER TABLET    Take 1 tablet by mouth every morning       ALLERGIES     Other and Sulfa antibiotics    FAMILYHISTORY       Family History   Problem Relation Age of Onset    Early Death Sister     Alcohol Abuse Sister     Early Death Mother     Heart Disease Mother     Alcohol Abuse Father     Prostate Cancer Father     Heart Disease Brother     Alcohol Abuse Brother         SOCIAL HISTORY
DISCONTINUED MEDICATIONS:  Discontinued Medications    No medications on file              (Please note that portions of this note were completed with a voice recognition program.  Efforts were made to edit the dictations but occasionally words are mis-transcribed.)    Salma Saab MD (electronically signed)            Marguerite Brown MD  04/17/23 1076

## 2023-04-17 NOTE — H&P
lesions. Neurologic:  Neurovascularly intact without any focal sensory/motor deficits. Cranial nerves: II-XII intact, grossly non-focal.  Psychiatric:  Alert and oriented, thought content appropriate, normal insight        Labs:     No results for input(s): WBC, HGB, HCT, PLT in the last 72 hours. No results for input(s): NA, K, CL, CO2, BUN, CREATININE, CALCIUM, PHOS in the last 72 hours. Invalid input(s): MAGNES  No results for input(s): AST, ALT, BILIDIR, BILITOT, ALKPHOS in the last 72 hours. No results for input(s): INR in the last 72 hours. No results for input(s): Ricke Dice in the last 72 hours. Urinalysis:      Lab Results   Component Value Date/Time    NITRU Negative 10/31/2021 11:05 PM    [address] 10/31/2021 11:05 PM    BACTERIA NONE SEEN 10/31/2021 11:05 PM    RBCUA 1-3 10/31/2021 11:05 PM    BLOODU Negative 10/31/2021 11:05 PM    SPECGRAV <=1.005 10/31/2021 11:05 PM    GLUCOSEU Negative 10/31/2021 11:05 PM         ASSESSMENT:  STEMI  Coronary artery disease with three-vessel disease refused CABG  Atrial fibrillation on Xarelto  Hypertension  Dementia      PLAN:  Patient was started on heparin by weight. He was given aspirin in the ER. Continue Lipitor and aspirin. Further medical management from cardiology as patient's family does not want any intervention. Given her soft blood pressure we will hold patient's diuretics. Also hold Xarelto as she has not started heparin by weight. Imdur on hold also. Blood pressure will defer to cardiology concerning further management. Gilbert Bolaños MD    Thank you Molly Tompkins III, DO for the opportunity to be involved in this patient's care. If you have any questions or concerns please feel free to contact me through [address] [place] [address].

## 2023-04-17 NOTE — CARE COORDINATION
Social Work/ Transition of Care:    Pt presents to the ED secondary to shortness of breath from the [facility] at [address] [place] [address]. Pt is a DNRCCA. Pt is admitted inpatient with STEMI. REYNOLD noted hospice consult. REYNOLD met with pt's daughter/POA, Nate Vazquez, who reports plan will be for pt to return to the [facility] at [address] [place] [address] with hospice care. SW provided freedom of choice and pt's daughter states they would like [facility]. REYNOLD left voice message for Maximo Lamas with [facility]. REYNOLD / CM to follow.

## 2023-04-17 NOTE — CONSULTS
no  significant disease. The third marginal branch has around 50% to 60%  distal disease. The third marginal branch has around 80% long proximal  vessel disease. RCA:  The right coronary artery is a large dominant vessel. It is heavily  calcified in its proximal and middle segments. There is a 90% ostial RCA  stenosis followed by a 95% very proximal vessel stenosis. The mid vessel  again was heavily calcified, but did not have high-grade discrete lesions. There was around 60% to 70% distal vessel disease before the posterior  descending artery branch and the posterolateral branch. The posterior  descending artery branch did not appear to have any significant disease. No intake or output data in the 24 hours ending 04/17/23 1030    Labs:   CBC: No results for input(s): WBC, HGB, HCT, PLT in the last 72 hours. BMP: No results for input(s): NA, K, CO2, BUN, CREATININE, LABGLOM, GLU, CALCIUM in the last 72 hours. Mag: No results for input(s): MG in the last 72 hours. Phos: No results for input(s): PHOS in the last 72 hours. TSH: No results for input(s): TSH in the last 72 hours. HgA1c:   Lab Results   Component Value Date    LABA1C 5.7 11/21/2017     No results found for: EAG  proBNP: No results for input(s): PROBNP in the last 72 hours. PT/INR: No results for input(s): PROTIME, INR in the last 72 hours. APTT:No results for input(s): APTT in the last 72 hours. CARDIAC ENZYMES:No results for input(s): CKTOTAL, CKMB, CKMBINDEX, TROPONINI in the last 72 hours. FASTING LIPID PANEL:  Lab Results   Component Value Date/Time    CHOL 122 11/21/2017 03:56 AM    HDL 31 11/21/2017 03:56 AM    LDLCALC 70 11/21/2017 03:56 AM    TRIG 103 11/21/2017 03:56 AM     LIVER PROFILE:No results for input(s): AST, ALT, LABALBU in the last 72 hours.     Discussed with Dr. Chandrakant Kerns; please see his assessment and recommendations to follow  Electronically signed by Jose M Baltazar PA-C on 4/17/2023 at 10:30 AM     I

## 2023-04-18 VITALS
RESPIRATION RATE: 18 BRPM | HEART RATE: 85 BPM | BODY MASS INDEX: 29 KG/M2 | WEIGHT: 157.6 LBS | HEIGHT: 62 IN | DIASTOLIC BLOOD PRESSURE: 78 MMHG | TEMPERATURE: 97.3 F | OXYGEN SATURATION: 94 % | SYSTOLIC BLOOD PRESSURE: 103 MMHG

## 2023-04-18 LAB
ANION GAP SERPL CALCULATED.3IONS-SCNC: 16 MMOL/L (ref 7–16)
APTT BLD: 62.2 SEC (ref 24.5–35.1)
APTT BLD: 91.5 SEC (ref 24.5–35.1)
BASOPHILS # BLD: 0.06 E9/L (ref 0–0.2)
BASOPHILS NFR BLD: 0.5 % (ref 0–2)
BUN SERPL-MCNC: 40 MG/DL (ref 6–23)
CALCIUM SERPL-MCNC: 9.2 MG/DL (ref 8.6–10.2)
CHLORIDE SERPL-SCNC: 95 MMOL/L (ref 98–107)
CO2 SERPL-SCNC: 22 MMOL/L (ref 22–29)
CREAT SERPL-MCNC: 1.7 MG/DL (ref 0.5–1)
EOSINOPHIL # BLD: 0.04 E9/L (ref 0.05–0.5)
EOSINOPHIL NFR BLD: 0.3 % (ref 0–6)
ERYTHROCYTE [DISTWIDTH] IN BLOOD BY AUTOMATED COUNT: 14.1 FL (ref 11.5–15)
GLUCOSE SERPL-MCNC: 134 MG/DL (ref 74–99)
HCT VFR BLD AUTO: 24.8 % (ref 34–48)
HGB BLD-MCNC: 7.7 G/DL (ref 11.5–15.5)
IMM GRANULOCYTES # BLD: 0.08 E9/L
IMM GRANULOCYTES NFR BLD: 0.7 % (ref 0–5)
LYMPHOCYTES # BLD: 2.08 E9/L (ref 1.5–4)
LYMPHOCYTES NFR BLD: 17.9 % (ref 20–42)
MCH RBC QN AUTO: 28.1 PG (ref 26–35)
MCHC RBC AUTO-ENTMCNC: 31 % (ref 32–34.5)
MCV RBC AUTO: 90.5 FL (ref 80–99.9)
MONOCYTES # BLD: 1.42 E9/L (ref 0.1–0.95)
MONOCYTES NFR BLD: 12.2 % (ref 2–12)
NEUTROPHILS # BLD: 7.96 E9/L (ref 1.8–7.3)
NEUTS SEG NFR BLD: 68.4 % (ref 43–80)
PLATELET # BLD AUTO: 253 E9/L (ref 130–450)
PMV BLD AUTO: 10.5 FL (ref 7–12)
POTASSIUM SERPL-SCNC: 3.6 MMOL/L (ref 3.5–5)
RBC # BLD AUTO: 2.74 E12/L (ref 3.5–5.5)
SODIUM SERPL-SCNC: 133 MMOL/L (ref 132–146)
WBC # BLD: 11.6 E9/L (ref 4.5–11.5)

## 2023-04-18 PROCEDURE — 85730 THROMBOPLASTIN TIME PARTIAL: CPT

## 2023-04-18 PROCEDURE — 99232 SBSQ HOSP IP/OBS MODERATE 35: CPT | Performed by: INTERNAL MEDICINE

## 2023-04-18 PROCEDURE — 80048 BASIC METABOLIC PNL TOTAL CA: CPT

## 2023-04-18 PROCEDURE — 2580000003 HC RX 258: Performed by: FAMILY MEDICINE

## 2023-04-18 PROCEDURE — 85025 COMPLETE CBC W/AUTO DIFF WBC: CPT

## 2023-04-18 RX ORDER — AMLODIPINE BESYLATE 5 MG/1
2.5 TABLET ORAL DAILY
Status: DISCONTINUED | OUTPATIENT
Start: 2023-04-18 | End: 2023-04-18 | Stop reason: HOSPADM

## 2023-04-18 RX ORDER — VALSARTAN 80 MG/1
80 TABLET ORAL EVERY MORNING
Status: DISCONTINUED | OUTPATIENT
Start: 2023-04-18 | End: 2023-04-18 | Stop reason: HOSPADM

## 2023-04-18 RX ORDER — 0.9 % SODIUM CHLORIDE 0.9 %
500 INTRAVENOUS SOLUTION INTRAVENOUS ONCE
Status: COMPLETED | OUTPATIENT
Start: 2023-04-18 | End: 2023-04-18

## 2023-04-18 RX ADMIN — SODIUM CHLORIDE 500 ML: 9 INJECTION, SOLUTION INTRAVENOUS at 04:43

## 2023-04-18 ASSESSMENT — PAIN - FUNCTIONAL ASSESSMENT
PAIN_FUNCTIONAL_ASSESSMENT: NONE - DENIES PAIN

## 2023-04-18 NOTE — ED NOTES
Dr. Viry Jones was at bedside stated that he was going to change medications/doses and stop heparin drip and ok discharge back to facility. ER doctor aware.       Dave Edwards RN  04/18/23 5101

## 2023-04-18 NOTE — DISCHARGE SUMMARY
dailyHistorical Med      loratadine (CLARITIN) 10 MG tablet Take 1 tablet by mouth dailyHistorical Med      escitalopram (LEXAPRO) 10 MG tablet Take 1 tablet by mouth dailyHistorical Med      valsartan-hydroCHLOROthiazide (DIOVAN-HCT) 160-12.5 MG per tablet Take 1 tablet by mouth every morning, Disp-90 tablet, R-3Normal      atorvastatin (LIPITOR) 40 MG tablet Take 1 tablet by mouth every morning, Disp-90 tablet, R-3Normal      rivaroxaban (XARELTO) 20 MG TABS tablet Take 1 tablet by mouth daily (with breakfast), Disp-90 tablet, R-3Normal      metoprolol succinate (TOPROL XL) 100 MG extended release tablet Take 1 tablet by mouth daily, Disp-90 tablet, R-5Normal      nitroGLYCERIN (NITROSTAT) 0.4 MG SL tablet Place 1 tablet under the tongue every 5 minutes as needed for Chest pain, Disp-25 tablet, R-3Phone In      !! acetaminophen (TYLENOL) 325 MG tablet Take 2 tablets by mouth every 4 hours as needed for PainHistorical Med      aspirin EC 81 MG EC tablet Take 1 tablet by mouth every morningHistorical Med       !! - Potential duplicate medications found. Please discuss with provider.         STOP taking these medications       traMADol (ULTRAM) 50 MG tablet Comments:   Reason for Stopping:         Menthol, Topical Analgesic, (BIOFREEZE ROLL-ON EX) Comments:   Reason for Stopping:             Activity: activity as tolerated  Diet: cardiac diet    Follow-up with 1 week cardiology      Signed:  Renetta Duque MD  4/18/2023  11:36 AM

## 2023-04-18 NOTE — ED NOTES
Message sent to Dr Goldstein Post to notify taht BP has been running low, BP now is 86/51 HR 78, Patinet is still arousable no visible distress noted no pain voiced.  Remains on Heparin drip and Katey Pod LISA Marshall RN  04/18/23 [id number]

## 2023-04-18 NOTE — ED NOTES
Spoke with Yanique Mulligan in lab regarding PTT that didn't result.  Lab will run this now     Linda France RN  04/18/23 0025

## 2023-04-18 NOTE — ED NOTES
APTT is in therapeutic range, no bolus or change in infusion rate at this time.       Muriel Wesley, GIANCARLO  04/18/23 [address] [facility] 1, RN  04/18/23 2888

## 2023-04-18 NOTE — PROGRESS NOTES
to the NH  Discontinue IV Heparin and resume Xarelto at 15 mg daily instead of 20 mg  Discontinue HCTZ ( done )  Decrease Valsartan to 80 mg daily  Decrease Amlodipine to 2.5 mg daily  Discontinue Aspirin and start Plavix 75 mg daily instead ( done )  Increase Ranexa to 1000 mg BID  Monitor BP: consider decreasing valsartan to 80 mg daily  Increase Ranexa to 1000 mg BID ( done )  Continue Imdur, Toprol XL and lipitor same  As stated earlier, no advance cardiac testing planned (as per family's request)   May be discharged from cardiology stand point         Electronically signed by Lori Simon MD on 4/18/2023 at 6:38 AM

## 2023-04-19 LAB
EKG ATRIAL RATE: 72 BPM
EKG Q-T INTERVAL: 470 MS
EKG QRS DURATION: 88 MS
EKG QTC CALCULATION (BAZETT): 510 MS
EKG R AXIS: -9 DEGREES
EKG T AXIS: -145 DEGREES
EKG VENTRICULAR RATE: 71 BPM

## 2023-04-19 PROCEDURE — 93010 ELECTROCARDIOGRAM REPORT: CPT | Performed by: INTERNAL MEDICINE

## 2023-08-14 NOTE — TELEPHONE ENCOUNTER
Chart ammended to reflect change of dosage of Lasix. Per daughter, the dosage has been given at 40 mg since the order has been changed. Pt needs refill cardiology

## 2024-01-07 NOTE — PROGRESS NOTES
Critical Care    Performed by: Marisol Simon MD  Authorized by: Marisol Simon [facility] Progress Note    Admitting Date and Time: 10/27/2021  7:49 PM  Admit Dx: Knee pain [M25.569]  Gait instability [R26.81]  Closed displaced transverse fracture of left patella, initial encounter [S82.032A]    Subjective:  Patient is being followed for Knee pain [M25.569]  Gait instability [R26.81]  Closed displaced transverse fracture of left patella, initial encounter Jacob Cross   Pt feels well today. She still does not know where she is at currently  She denies any current pains other than her left knee. She feels that the tylenol is helping this. PT was painful though. ROS: denies fever, chills, cp, sob, n/v, HA unless stated above.       rivaroxaban  20 mg Oral Daily with breakfast    sodium chloride flush  5-40 mL IntraVENous 2 times per day    famotidine  20 mg Oral Daily    Or    famotidine (PEPCID) injection  20 mg IntraVENous Daily    amLODIPine  5 mg Oral Daily    aspirin EC  81 mg Oral QAM    atorvastatin  40 mg Oral QAM    escitalopram  10 mg Oral Daily    isosorbide mononitrate  60 mg Oral Daily    metoprolol succinate  100 mg Oral Daily    ranolazine  500 mg Oral BID    valsartan  160 mg Oral Daily    And    hydroCHLOROthiazide  12.5 mg Oral Daily     sodium chloride flush, 5-40 mL, PRN  sodium chloride, 25 mL, PRN  ondansetron, 4 mg, Q8H PRN   Or  ondansetron, 4 mg, Q6H PRN  oxyCODONE, 5 mg, Q6H PRN  morphine, 2 mg, Q4H PRN  acetaminophen, 500 mg, Q6H PRN  traMADol, 50 mg, Daily PRN         Objective:    /70   Pulse 142   Temp 99 °F (37.2 °C)   Resp 16   Ht 5' 2\" (1.575 m)   Wt 145 lb (65.8 kg)   SpO2 92%   BMI 26.52 kg/m²     General Appearance: drowsy and in no acute distress  Skin: warm and dry  Head: normocephalic and atraumatic  Neck: neck supple and non tender without mass   Pulmonary/Chest: clear to auscultation bilaterally- no wheezes, rales or rhonchi, normal air movement, no respiratory distress  Cardiovascular: normal rate, normal S1 and S2 and no carotid bruits  Abdomen: soft, non-tender, non-distended, normal bowel sounds, no masses or organomegaly  Extremities: no cyanosis, no clubbing and no edema; knee immobilizer on the left          Recent Labs     10/29/21  0548 10/30/21  0118 10/31/21  0305    133 135   K 3.8 4.5 4.2   CL 99 100 100   CO2 23 18* 23   BUN 16 14 27*   CREATININE 0.7 0.6 0.7   GLUCOSE 104* 167* 114*   CALCIUM 10.5* 9.4 9.5       Recent Labs     10/29/21  0548 10/30/21  0118 10/31/21  0305   WBC 14.0* 17.6* 19.5*   RBC 4.13 3.63 3.61   HGB 12.3 11.1* 10.8*   HCT 37.0 32.8* 32.1*   MCV 89.6 90.4 88.9   MCH 29.8 30.6 29.9   MCHC 33.2 33.8 33.6   RDW 14.4 14.4 14.6    173 176   MPV 12.4* 12.0 11.6           Assessment:    Principal Problem:    Displaced transverse fracture of left patella, initial encounter for closed fracture  Active Problems:    Essential hypertension    Coronary artery disease involving native coronary artery of native heart without angina pectoris    Knee pain    Chronic atrial fibrillation (HCC)    Dementia without behavioral disturbance (HCC)  Resolved Problems:    * No resolved hospital problems. *      Plan:  1. S/P ORIF left patella-care per ortho; will need subacute rehab; likely discharge Monday  2. Atrial fibrillation-continue metoprolol. Resume xarelto and stay on long term. 3.  Dementia-continue rivastigmine  4. CAD-continue aspirin and statin  Dispo:  Patient okay for discharge to [facility] 10/24. COVID test ordered to prepare for discharge as needed by [facility]  Code status-DNR CCA      NOTE: This report was transcribed using voice recognition software. Every effort was made to ensure accuracy; however, inadvertent computerized transcription errors may be present.   Electronically signed by Wilbur Starks MD on 10/31/2021 at 1:50 PM
